# Patient Record
Sex: FEMALE | Race: WHITE | Employment: OTHER | URBAN - METROPOLITAN AREA
[De-identification: names, ages, dates, MRNs, and addresses within clinical notes are randomized per-mention and may not be internally consistent; named-entity substitution may affect disease eponyms.]

---

## 2017-01-03 ENCOUNTER — TELEPHONE (OUTPATIENT)
Dept: NEUROLOGY | Age: 82
End: 2017-01-03

## 2017-06-13 ENCOUNTER — TELEPHONE (OUTPATIENT)
Dept: NEUROLOGY | Age: 82
End: 2017-06-13

## 2017-07-20 ENCOUNTER — TELEPHONE (OUTPATIENT)
Dept: NEUROLOGY | Age: 82
End: 2017-07-20

## 2017-07-20 ENCOUNTER — OFFICE VISIT (OUTPATIENT)
Dept: NEUROLOGY | Age: 82
End: 2017-07-20

## 2017-07-20 VITALS
HEIGHT: 67 IN | WEIGHT: 151 LBS | SYSTOLIC BLOOD PRESSURE: 110 MMHG | HEART RATE: 80 BPM | OXYGEN SATURATION: 96 % | BODY MASS INDEX: 23.7 KG/M2 | DIASTOLIC BLOOD PRESSURE: 60 MMHG

## 2017-07-20 DIAGNOSIS — I63.232 ARTERIAL ISCHEMIC STROKE, ICA, LEFT, ACUTE (HCC): Primary | ICD-10-CM

## 2017-07-20 DIAGNOSIS — F34.1 DYSTHYMIA: ICD-10-CM

## 2017-07-20 DIAGNOSIS — I25.10 CAD IN NATIVE ARTERY: ICD-10-CM

## 2017-07-20 DIAGNOSIS — I10 HTN (HYPERTENSION), BENIGN: ICD-10-CM

## 2017-07-20 DIAGNOSIS — E78.2 MIXED HYPERLIPIDEMIA: ICD-10-CM

## 2017-07-20 PROCEDURE — G8400 PT W/DXA NO RESULTS DOC: HCPCS | Performed by: PSYCHIATRY & NEUROLOGY

## 2017-07-20 PROCEDURE — 1036F TOBACCO NON-USER: CPT | Performed by: PSYCHIATRY & NEUROLOGY

## 2017-07-20 PROCEDURE — 1123F ACP DISCUSS/DSCN MKR DOCD: CPT | Performed by: PSYCHIATRY & NEUROLOGY

## 2017-07-20 PROCEDURE — G8427 DOCREV CUR MEDS BY ELIG CLIN: HCPCS | Performed by: PSYCHIATRY & NEUROLOGY

## 2017-07-20 PROCEDURE — G8420 CALC BMI NORM PARAMETERS: HCPCS | Performed by: PSYCHIATRY & NEUROLOGY

## 2017-07-20 PROCEDURE — G8598 ASA/ANTIPLAT THER USED: HCPCS | Performed by: PSYCHIATRY & NEUROLOGY

## 2017-07-20 PROCEDURE — 1090F PRES/ABSN URINE INCON ASSESS: CPT | Performed by: PSYCHIATRY & NEUROLOGY

## 2017-07-20 PROCEDURE — 99214 OFFICE O/P EST MOD 30 MIN: CPT | Performed by: PSYCHIATRY & NEUROLOGY

## 2017-07-20 PROCEDURE — 4040F PNEUMOC VAC/ADMIN/RCVD: CPT | Performed by: PSYCHIATRY & NEUROLOGY

## 2018-01-29 ENCOUNTER — TELEPHONE (OUTPATIENT)
Dept: ORTHOPEDIC SURGERY | Age: 83
End: 2018-01-29

## 2018-01-29 PROBLEM — R53.1 WEAKNESS: Status: ACTIVE | Noted: 2018-01-29

## 2018-01-29 NOTE — TELEPHONE ENCOUNTER
There is a inpatient consult request at [place] [address]   Consult DX: left distal radius and ulna fx  RN is Alan Barton [phone]    Marilynn Alford consult info to [address].

## 2018-01-30 PROBLEM — S62.102S WRIST FRACTURE, CLOSED, LEFT, SEQUELA: Status: ACTIVE | Noted: 2018-01-30

## 2018-01-31 PROBLEM — R53.81 DEBILITY: Status: ACTIVE | Noted: 2018-01-31

## 2018-02-06 ENCOUNTER — TELEPHONE (OUTPATIENT)
Dept: ORTHOPEDIC SURGERY | Age: 83
End: 2018-02-06

## 2018-02-08 NOTE — TELEPHONE ENCOUNTER
Called and left voicemail stating to give our office a call back for Dr Pritesh Morrison was returning her call in regards to her mother

## 2018-02-13 ENCOUNTER — TELEPHONE (OUTPATIENT)
Dept: ORTHOPEDIC SURGERY | Age: 83
End: 2018-02-13

## 2018-02-22 ENCOUNTER — TELEPHONE (OUTPATIENT)
Dept: CARDIOLOGY CLINIC | Age: 83
End: 2018-02-22

## 2018-02-22 NOTE — TELEPHONE ENCOUNTER
Called left message on April --supervisor Sha 90 [phone]  I said we will put her on  schedule tomorrow and to call back if she cant make it. Please add onto  schedule Thanks

## 2018-02-23 ENCOUNTER — TELEPHONE (OUTPATIENT)
Dept: CARDIOLOGY CLINIC | Age: 83
End: 2018-02-23

## 2018-02-23 ENCOUNTER — TELEPHONE (OUTPATIENT)
Dept: CARDIOLOGY | Age: 83
End: 2018-02-23

## 2018-02-23 NOTE — TELEPHONE ENCOUNTER
Attempted to call,no answer.  Dr Lauren Alvarez stated she would call him after his wifes appointment Monday regarding abnormalities

## 2018-02-26 ENCOUNTER — OFFICE VISIT (OUTPATIENT)
Dept: CARDIOLOGY CLINIC | Age: 83
End: 2018-02-26

## 2018-02-26 VITALS
HEIGHT: 67 IN | WEIGHT: 127 LBS | BODY MASS INDEX: 19.93 KG/M2 | SYSTOLIC BLOOD PRESSURE: 160 MMHG | HEART RATE: 87 BPM | DIASTOLIC BLOOD PRESSURE: 90 MMHG

## 2018-02-26 DIAGNOSIS — I25.10 CAD IN NATIVE ARTERY: ICD-10-CM

## 2018-02-26 DIAGNOSIS — I50.9 CONGESTIVE HEART FAILURE, UNSPECIFIED CONGESTIVE HEART FAILURE CHRONICITY, UNSPECIFIED CONGESTIVE HEART FAILURE TYPE: ICD-10-CM

## 2018-02-26 DIAGNOSIS — I10 HTN (HYPERTENSION), BENIGN: Primary | ICD-10-CM

## 2018-02-26 DIAGNOSIS — I25.5 ISCHEMIC CARDIOMYOPATHY: ICD-10-CM

## 2018-02-26 DIAGNOSIS — I48.91 ATRIAL FIBRILLATION, UNSPECIFIED TYPE (HCC): ICD-10-CM

## 2018-02-26 PROCEDURE — 99204 OFFICE O/P NEW MOD 45 MIN: CPT | Performed by: INTERNAL MEDICINE

## 2018-02-26 PROCEDURE — 93000 ELECTROCARDIOGRAM COMPLETE: CPT | Performed by: INTERNAL MEDICINE

## 2018-02-26 RX ORDER — LISINOPRIL 5 MG/1
5 TABLET ORAL DAILY
Qty: 30 TABLET | Refills: 11 | Status: SHIPPED | OUTPATIENT
Start: 2018-02-26 | End: 2018-04-05 | Stop reason: DRUGHIGH

## 2018-02-26 NOTE — PROGRESS NOTES
History Narrative    No narrative on file        Family History:  No evidence for sudden cardiac death or premature CAD. Problem Relation Age of Onset    Cancer Father      unsure of type    Cancer Brother      lung cancer       Medications:  [x] Medications and dosages reviewed. Prior to Admission medications    Medication Sig Start Date End Date Taking? Authorizing Provider   lisinopril (PRINIVIL;ZESTRIL) 5 MG tablet Take 1 tablet by mouth daily 2/26/18  Yes Coyne Share, DO   acetaminophen (TYLENOL) 325 MG tablet Take 2 tablets by mouth 3 times daily 2/7/18  Yes Mega Fuentes MD   aspirin 81 MG EC tablet Take 1 tablet by mouth daily 11/18/16  Yes Mega Fuentes MD   atorvastatin (LIPITOR) 80 MG tablet Take 1 tablet by mouth nightly 11/18/16  Yes Mega Fuentes MD   carvedilol (COREG) 12.5 MG tablet Take 1 tablet by mouth 2 times daily (with meals) 11/18/16  Yes Mega Fuentes MD   clopidogrel (PLAVIX) 75 MG tablet Take 1 tablet by mouth daily 11/18/16  Yes Mega Fuentes MD   sertraline (ZOLOFT) 50 MG tablet Take 1 tablet by mouth daily 11/18/16  Yes Mega Fuentes MD   Omega-3 Fatty Acids (FISH OIL) 1200 MG CPDR Take by mouth   Yes Historical Provider, MD       Allergies:  Patient has no known allergies.      Review of Systems:    [x]Full ROS obtained and negative except as mentioned in HPI    Physical Examination:    BP (!) 160/90   Pulse 87   Ht 5' 7\" (1.702 m)   Wt 127 lb (57.6 kg)   BMI 19.89 kg/m²   Wt Readings from Last 3 Encounters:   02/26/18 127 lb (57.6 kg)   01/31/18 127 lb (57.6 kg)   01/29/18 127 lb (57.6 kg)       · GENERAL: Well developed,  no acute distress  · NEUROLOGICAL: Alert, flat affect, pleasant, quiet but answers appropriately     · SKIN: Warm and dry  · HEENT: Normocephalic, atraumatic, Sclera non-icteric, mucous membranes moist, facial droop  · NECK: supple, JVP normal  · CAROTID: Normal upstroke, no bruits  · CARDIAC: Normal PMI, regular rate and rhythm,

## 2018-02-28 ENCOUNTER — HOSPITAL ENCOUNTER (OUTPATIENT)
Dept: NON INVASIVE DIAGNOSTICS | Age: 83
Discharge: HOME | End: 2018-02-28
Attending: INTERNAL MEDICINE | Admitting: INTERNAL MEDICINE

## 2018-02-28 DIAGNOSIS — I25.10 ATHEROSCLEROTIC HEART DISEASE OF NATIVE CORONARY ARTERY WITHOUT ANGINA PECTORIS: ICD-10-CM

## 2018-02-28 LAB
LV EF: 60 %
LVEF MODALITY: NORMAL

## 2018-03-05 ENCOUNTER — TELEPHONE (OUTPATIENT)
Dept: CARDIOLOGY CLINIC | Age: 83
End: 2018-03-05

## 2018-03-05 NOTE — TELEPHONE ENCOUNTER
Pt  calling (He is presently in [facility] [address]) and has questions about his wife's health. She is 5'7\" and weighs 122 lbs he finds this alarming, her BP is not stable, he mentioned her being on Eliquis and questions concerning this as well.  Pls call to advise Thank you

## 2018-03-06 ENCOUNTER — TELEPHONE (OUTPATIENT)
Dept: CARDIOLOGY CLINIC | Age: 83
End: 2018-03-06

## 2018-03-06 NOTE — TELEPHONE ENCOUNTER
I spoke with patient's  and he is wanting to know if he can stop her Xarelto and Lasix. She has an appointment with RG NP on Thursday 3/8/18 and her  says she has been falling and wants to stop her Xarelto. He was told to have her keep her appointment on Thursday ad she could discuss this with RG. He was also concerned that her BP has been running too low 's-130's and he thinks that lasix is contributing. He was told that 120's-130's is an acceptable normal BP and that she could discuss this further with RG with her upcoming appointment as well. He verbalizes understanding and is agreeable to this plan.

## 2018-03-08 ENCOUNTER — OFFICE VISIT (OUTPATIENT)
Dept: CARDIOLOGY CLINIC | Age: 83
End: 2018-03-08

## 2018-03-08 ENCOUNTER — HOSPITAL ENCOUNTER (OUTPATIENT)
Dept: OTHER | Age: 83
Discharge: HOME | End: 2018-03-08
Attending: CLINICAL NURSE SPECIALIST | Admitting: CLINICAL NURSE SPECIALIST

## 2018-03-08 VITALS — SYSTOLIC BLOOD PRESSURE: 130 MMHG | HEART RATE: 72 BPM | DIASTOLIC BLOOD PRESSURE: 76 MMHG

## 2018-03-08 DIAGNOSIS — I50.32 CHRONIC DIASTOLIC HEART FAILURE (HCC): Primary | ICD-10-CM

## 2018-03-08 DIAGNOSIS — I25.83 CORONARY ARTERY DISEASE DUE TO LIPID RICH PLAQUE: ICD-10-CM

## 2018-03-08 DIAGNOSIS — I10 ESSENTIAL HYPERTENSION: ICD-10-CM

## 2018-03-08 DIAGNOSIS — D50.9 IRON DEFICIENCY ANEMIA, UNSPECIFIED IRON DEFICIENCY ANEMIA TYPE: ICD-10-CM

## 2018-03-08 DIAGNOSIS — I25.5 ISCHEMIC CARDIOMYOPATHY: ICD-10-CM

## 2018-03-08 DIAGNOSIS — I63.232 ARTERIAL ISCHEMIC STROKE, ICA, LEFT, ACUTE (HCC): ICD-10-CM

## 2018-03-08 DIAGNOSIS — I48.20 CHRONIC ATRIAL FIBRILLATION (HCC): ICD-10-CM

## 2018-03-08 DIAGNOSIS — I25.10 CORONARY ARTERY DISEASE DUE TO LIPID RICH PLAQUE: ICD-10-CM

## 2018-03-08 LAB
ANION GAP SERPL CALCULATED.3IONS-SCNC: 11 MMOL/L (ref 3–16)
BUN BLDV-MCNC: 32 MG/DL (ref 7–20)
CALCIUM SERPL-MCNC: 10 MG/DL (ref 8.3–10.6)
CHLORIDE BLD-SCNC: 104 MMOL/L (ref 99–110)
CO2: 31 MMOL/L (ref 21–32)
CREAT SERPL-MCNC: 1 MG/DL (ref 0.6–1.2)
FERRITIN: 62.1 NG/ML (ref 15–150)
FOLATE: 12.04 NG/ML (ref 4.78–24.2)
GFR AFRICAN AMERICAN: >60
GFR NON-AFRICAN AMERICAN: 53
GLUCOSE BLD-MCNC: 103 MG/DL (ref 70–99)
IRON SATURATION: 9 % (ref 15–50)
IRON: 33 UG/DL (ref 37–145)
POTASSIUM SERPL-SCNC: 4.2 MMOL/L (ref 3.5–5.1)
PRO-BNP: 2406 PG/ML (ref 0–449)
SODIUM BLD-SCNC: 146 MMOL/L (ref 136–145)
TOTAL IRON BINDING CAPACITY: 362 UG/DL (ref 260–445)
VITAMIN B-12: 481 PG/ML (ref 211–911)

## 2018-03-08 PROCEDURE — 1036F TOBACCO NON-USER: CPT | Performed by: CLINICAL NURSE SPECIALIST

## 2018-03-08 PROCEDURE — G8427 DOCREV CUR MEDS BY ELIG CLIN: HCPCS | Performed by: CLINICAL NURSE SPECIALIST

## 2018-03-08 PROCEDURE — 99215 OFFICE O/P EST HI 40 MIN: CPT | Performed by: CLINICAL NURSE SPECIALIST

## 2018-03-08 PROCEDURE — 4040F PNEUMOC VAC/ADMIN/RCVD: CPT | Performed by: CLINICAL NURSE SPECIALIST

## 2018-03-08 PROCEDURE — 1111F DSCHRG MED/CURRENT MED MERGE: CPT | Performed by: CLINICAL NURSE SPECIALIST

## 2018-03-08 PROCEDURE — G8420 CALC BMI NORM PARAMETERS: HCPCS | Performed by: CLINICAL NURSE SPECIALIST

## 2018-03-08 PROCEDURE — 1090F PRES/ABSN URINE INCON ASSESS: CPT | Performed by: CLINICAL NURSE SPECIALIST

## 2018-03-08 PROCEDURE — G8482 FLU IMMUNIZE ORDER/ADMIN: HCPCS | Performed by: CLINICAL NURSE SPECIALIST

## 2018-03-08 PROCEDURE — 1123F ACP DISCUSS/DSCN MKR DOCD: CPT | Performed by: CLINICAL NURSE SPECIALIST

## 2018-03-08 PROCEDURE — G8598 ASA/ANTIPLAT THER USED: HCPCS | Performed by: CLINICAL NURSE SPECIALIST

## 2018-03-08 PROCEDURE — G8400 PT W/DXA NO RESULTS DOC: HCPCS | Performed by: CLINICAL NURSE SPECIALIST

## 2018-03-08 RX ORDER — FUROSEMIDE 40 MG/1
40 TABLET ORAL DAILY
COMMUNITY
End: 2018-03-08 | Stop reason: DRUGHIGH

## 2018-03-08 RX ORDER — FUROSEMIDE 40 MG/1
20 TABLET ORAL SEE ADMIN INSTRUCTIONS
Qty: 60 TABLET | Status: ON HOLD | COMMUNITY
Start: 2018-03-08 | End: 2018-04-10

## 2018-03-08 RX ORDER — ALBUTEROL SULFATE 2.5 MG/3ML
2.5 SOLUTION RESPIRATORY (INHALATION) EVERY 6 HOURS PRN
COMMUNITY
End: 2019-03-29 | Stop reason: ALTCHOICE

## 2018-03-08 NOTE — PROGRESS NOTES
[address]  Progress Note    Primary Care Doctor:  Nora Spear MD    Chief Complaint   Patient presents with    Coronary Artery Disease        History of Present Illness:  80year old female with CAD with distant stent 2007, CVA in 10/16 in a wheelchair with right side residual, HTN, AF (on aspirin per family request), dCHF, HLD, anemia    I had the pleasure of seeing Dorothy Candelario in follow up for dCHF. Patient recently seen by Dr Terry Hunt per request of the . She is currently in a nursing in a wheelchair. She is here today with her daughter and , Reyes Pederson (recovering from amputation) on the phone. She was started on lasix 40 mg 2/8/18 and has lost 20 pounds. She had a cxr (report in records from nursing home) done 2/13 which showed mild CHF with slight improvement. The family is very concerned that she is becoming dehydrated. Labs done 3/5/18 show bnp of 374 (only one done) creat 1.0 potassium of 3.8 bun 33. The patient can not provide much information. BP has been fluctuation from -170  She is on a nectar thickened diet, did receive an extra dose of lasix last week. Her Hgb 8.6  Both  and daughter are unhappy with the nursing home ([facility])    Past Medical History:   has a past medical history of CAD (coronary artery disease); CVA (cerebral vascular accident) ([address] and Hypertension. Surgical History:   has a past surgical history that includes Coronary angioplasty with stent (2007) and Cosmetic surgery. Social History:   reports that she has never smoked. She has never used smokeless tobacco. She reports that she does not drink alcohol. Family History:   Family History   Problem Relation Age of Onset   [place] Cancer Father      unsure of type    Cancer Brother      lung cancer       Home Medications:  Prior to Admission medications    Medication Sig Start Date End Date Taking?  Authorizing Provider   enoxaparin (LOVENOX) 40 MG/0.4ML muscle strength, numbness or tingling. No change in gait, balance, coordination, mood, affect, memory, mentation, behavior. · Psychiatric: No anxiety, no depression. · Endocrine: No malaise, fatigue or temperature intolerance. No excessive thirst, fluid intake, or urination. No tremor. · Hematologic/Lymphatic: No abnormal bruising or bleeding, blood clots or swollen lymph nodes. · Allergic/Immunologic: No nasal congestion or hives. Physical Examination:    Vitals:    03/08/18 1416   BP: 130/76   Pulse: 72        Constitutional and General Appearance: Warm and dry, no apparent distress, normal coloration, facial droop (left)  HEENT:  Normocephalic, atraumatic  Respiratory:  · Normal excursion and expansion without use of accessory muscles  · Resp Auscultation: Normal breath sounds without dullness  Cardiovascular:  · The apical impulses not displaced  · Heart tones are crisp and normal  · JVP normal cm H2O  · irregular rate and rhythm  · Peripheral pulses are symmetrical and full  · There is no clubbing, cyanosis of the extremities.   · No edema  · Pedal Pulses: 2+ and equal   Abdomen:  · No masses or tenderness  · Liver/Spleen: No Abnormalities Noted  Neurological/Psychiatric:  · Alert and oriented in all spheres  · Left arm in split, right arm in brace and weak, right leg is able to move by patient lifting it  · Exhibits normal gait balance and coordination  · No abnormalities of mood, affect, memory, mentation, or behavior are noted    Lab Data:    CBC:   Lab Results   Component Value Date    WBC 8.3 02/08/2018    WBC 8.2 02/05/2018    WBC 8.5 02/01/2018    RBC 2.85 02/08/2018    RBC 2.96 02/05/2018    RBC 2.99 02/01/2018    HGB 8.6 02/08/2018    HGB 8.9 02/05/2018    HGB 9.0 02/01/2018    HCT 26.3 02/08/2018    HCT 26.7 02/05/2018    HCT 26.5 02/01/2018    MCV 92.2 02/08/2018    MCV 90.4 02/05/2018    MCV 88.4 02/01/2018    RDW 17.3 02/08/2018    RDW 16.2 02/05/2018    RDW 15.3 02/01/2018     2018     2018     2018     BMP:  Lab Results   Component Value Date     2018     2018     2018    K 4.3 2018    K 4.1 2018    K 3.9 2018     2018     2018     2018    CO2 26 2018    CO2 27 2018    CO2 29 2018    BUN 49 2018    BUN 35 2018    BUN 34 2018    CREATININE 0.9 2018    CREATININE 0.8 2018    CREATININE 1.0 2018     BNP: No results found for: PROBNP  Cardiac Imagin/28/18 echo   Normal left ventricle size and systolic function with an EF 60%. Moderate concentric left ventricular hypertrophy. Diastolic filling parameters suggests diastolic function. Moderate mitral regurgitation  The left atrium is dilated. Moderate tricuspid regurgitation. RVSP 44mmHg. The right atrium is mildly dilated. Mild pulmonic regurgitation    Echo 10/17/16  Left ventricle systolic function is mildly reduced with an estimated   ejection fraction of 45%. There is hypokinesis of the basal inferior and   inferior walls.   Normal left ventricle size.   Mild concentric left ventricular hypertrophy.   Diastolic filling parameters suggests grade I diastolic dysfunction .   Mild mitral regurgitation .   Trivial tricuspid regurgitation.   Systolic pulmonary artery pressure (SPAP) is normal and estimated at 18 mmHg   (RA pressure 3 mm Hg). Assessment:    1. Chronic diastolic heart failure (HCC) on ace and BB   2. Iron deficiency anemia, unspecified iron deficiency anemia type    3. Coronary artery disease due to lipid rich plaque on GDMT   4. Essential hypertension controlled in office visit today   5. Chronic atrial fibrillation (HCC) aspirin per family request   6. Ischemic cardiomyopathy    7. Arterial ischemic stroke, ICA, left, acute ([address]        Plan:   1. Please check iron studies along with bmp and bnp on Monday. Fax to [phone].   Daughter

## 2018-03-08 NOTE — PATIENT INSTRUCTIONS
1. Please check iron studies along with bmp and bnp on Monday. Fax to [phone]. Daughter decided to have the iron studies after her visit. She will need to still have bmp and bnp next week  2. Calorie count with nutrition to follow as patient may not be getting enough calories  3. Decrease lasix to 20 mg daily  4. RTO in 1 month  5. Please call us with any questions or issues [phone]  6.    Recommend follow up with neurologist regarding fluctuating BP

## 2018-03-12 ENCOUNTER — TELEPHONE (OUTPATIENT)
Dept: CARDIOLOGY CLINIC | Age: 83
End: 2018-03-12

## 2018-03-12 RX ORDER — FERROUS SULFATE 325(65) MG
325 TABLET ORAL 2 TIMES DAILY WITH MEALS
Qty: 30 TABLET | Refills: 0
Start: 2018-03-12 | End: 2019-03-29

## 2018-03-12 NOTE — TELEPHONE ENCOUNTER
No return call yet, so I called again. Spoke with Ab Mcdonnell on rehab unit and gave orders for ferrous sulfate 325 mg po bid. She confirmed that lasix has been decreased to 20 mg po daily.

## 2018-03-12 NOTE — TELEPHONE ENCOUNTER
----- Message from Xi Jauregui NP sent at 3/12/2018  8:45 AM EDT -----  Please call daughter and nursing home  Her iron is low and she should start iron 325 mg bid and make sure nursing home has decreased lasix 20 mg daily  Draw bnp and bmp in 2 weeks  thanks

## 2018-03-12 NOTE — TELEPHONE ENCOUNTER
Had to LVM at nursing unit for patient to call us for instructions from [facility].

## 2018-03-14 ENCOUNTER — TELEPHONE (OUTPATIENT)
Dept: CARDIOLOGY CLINIC | Age: 83
End: 2018-03-14

## 2018-03-14 NOTE — TELEPHONE ENCOUNTER
Per nurse on rehab unit at [facility],  BP has been:    3/14 am:  151/74    3/13: am 178/87,  Pm 155/90     3/12 pm 118/56      3/11 155/85 previous BPs: 139/72, 138/87   There are parameters to hold lisinopril 5mg unless BP is over 135/85. So there has been some ups and downs due to her getting the lisinopril, then not needing it. Dr. Shayy Lopez saw patient yesterday and changed lisinopril dosing time to PM to see if this helps.   In past four days there was only one BP where systolic was 040

## 2018-03-15 ENCOUNTER — OFFICE VISIT (OUTPATIENT)
Dept: ORTHOPEDIC SURGERY | Age: 83
End: 2018-03-15

## 2018-03-15 VITALS — WEIGHT: 127 LBS | HEIGHT: 67 IN | RESPIRATION RATE: 16 BRPM | BODY MASS INDEX: 19.93 KG/M2

## 2018-03-15 DIAGNOSIS — S52.572A OTHER CLOSED INTRA-ARTICULAR FRACTURE OF DISTAL END OF LEFT RADIUS, INITIAL ENCOUNTER: Primary | ICD-10-CM

## 2018-03-15 PROCEDURE — 99024 POSTOP FOLLOW-UP VISIT: CPT | Performed by: NURSE PRACTITIONER

## 2018-03-15 NOTE — PROGRESS NOTES
Medication Sig Dispense Refill    ferrous sulfate (DALJIT-HARRIS) 325 (65 Fe) MG tablet Take 1 tablet by mouth 2 times daily (with meals) 30 tablet 0    enoxaparin (LOVENOX) 40 MG/0.4ML injection Inject 40 mg into the skin daily      albuterol (PROVENTIL) (2.5 MG/3ML) 0.083% nebulizer solution Take 2.5 mg by nebulization every 6 hours as needed for Wheezing      furosemide (LASIX) 40 MG tablet Take 0.5 tablets by mouth daily 60 tablet     lisinopril (PRINIVIL;ZESTRIL) 5 MG tablet Take 1 tablet by mouth daily 30 tablet 11    acetaminophen (TYLENOL) 325 MG tablet Take 2 tablets by mouth 3 times daily 120 tablet 0    aspirin 81 MG EC tablet Take 1 tablet by mouth daily 30 tablet 3    atorvastatin (LIPITOR) 80 MG tablet Take 1 tablet by mouth nightly 30 tablet 3    carvedilol (COREG) 12.5 MG tablet Take 1 tablet by mouth 2 times daily (with meals) 60 tablet 3    clopidogrel (PLAVIX) 75 MG tablet Take 1 tablet by mouth daily 30 tablet 3    sertraline (ZOLOFT) 50 MG tablet Take 1 tablet by mouth daily 30 tablet 3    Omega-3 Fatty Acids (FISH OIL) 1200 MG CPDR Take by mouth       No current facility-administered medications on file prior to visit. Pertinent items are noted in HPI  Review of systems reviewed from Patient History Form dated on 3/15/2018 and available in the patient's chart under the Media tab. PHYSICAL EXAMINATION:  Ms. Rebecca Banda is a very pleasant 80 y.o.  female who presents today in no acute distress, awake, alert. She is well dressed, nourished and  groomed. Patient with normal affect. Height is  5' 7\" (1.702 m), weight is 127 lb (57.6 kg), Body mass index is 19.89 kg/m². Resting respiratory rate is 16. On evaluation of her left upper extremity, there is no deformity. There is minimal swelling and no ecchymosis. She is nontender to palpation over the distal radius, and otherwise nontender over the remainder of the extremity.   Range of motion is decreased left wrist, but no mechanical block. The skin overlying the left wrist has skin excoriation with scabbed abrasions on the akins surface. No drainage. Distal pulses are 2+ and symmetric bilaterally. Sensation is grossly intact to light touch and symmetric bilaterally. IMAGING:  Xrays dated today in office, 3 views of left wrist were reviewed, and showed minimally displaced distal radius fracture. IMPRESSION:  Left minimally displaced distal radius fracture. PLAN: I discussed that the overall alignment of this fracture. She can discontinue the brace left wrist, WBAT. No heavy lifting. I discussed with the patient that I think that she would really benefit from a course of physical therapy for further strengthening and stretching. We will see her  back in 6 weeks at which time we will get a new xray of the left wrist.      As this patient has demonstrated risk factors for osteoporosis, such as age greater than [de-identified] years and evidence of a fracture, I have referred the patient back to the primary care physician for evaluation for osteoporosis, including consideration for DEXA scanning, if this is felt to be clinically indicated. The patient is advised to contact the primary care physician to follow-up for further evaluation. Dictated by Denece Dancer, CNP and seen and evaluated by Dr Justino Gaffney.

## 2018-03-16 ENCOUNTER — TELEPHONE (OUTPATIENT)
Dept: CARDIOLOGY CLINIC | Age: 83
End: 2018-03-16

## 2018-03-16 NOTE — TELEPHONE ENCOUNTER
Spoke w/ pt  and gave recs per Edilma Wick NP. He is going to have his daughter call for med rec, as well.

## 2018-03-16 NOTE — TELEPHONE ENCOUNTER
Spoke with patients  daughter/: BP's (some readings)   3/11/18- 110/55  3/13/18- 178/87  3/14/18- 118/52  3/15/18- 160/96  Patients  seems very concerned about patients condition, does not think nursing home is really taking great care of patient. . Patient gets dizzy spells and thinks patients has been acting different. Per [facility] MD should be in control of patients care. Patients  requesting MD tavares call them to talk; states it is best to talk with daughter.

## 2018-03-19 ENCOUNTER — TELEPHONE (OUTPATIENT)
Dept: CARDIOLOGY CLINIC | Age: 83
End: 2018-03-19

## 2018-03-19 NOTE — TELEPHONE ENCOUNTER
Returning a call to the daughter Teresita Hodgkin and she states that with the medication changes made over the weekend  her mother's blood pressure remains high. Medication changes as instructed in previous phone call. She wants her mother to be seen this week in office for BP medication management with Dr. Sheyla Bhagat or RG NP. She is concerned that her mother is at risks for another stroke if her BP remains too elevated. Please advise if we may add her to your schedule. Thanks.

## 2018-03-20 ENCOUNTER — TELEPHONE (OUTPATIENT)
Dept: CARDIOLOGY CLINIC | Age: 83
End: 2018-03-20

## 2018-03-20 DIAGNOSIS — E78.2 MIXED HYPERLIPIDEMIA: Primary | ICD-10-CM

## 2018-03-20 DIAGNOSIS — I25.10 CAD IN NATIVE ARTERY: ICD-10-CM

## 2018-03-20 DIAGNOSIS — I63.9 CEREBROVASCULAR ACCIDENT (CVA), UNSPECIFIED MECHANISM (HCC): ICD-10-CM

## 2018-03-20 DIAGNOSIS — I10 HTN (HYPERTENSION), BENIGN: ICD-10-CM

## 2018-03-20 DIAGNOSIS — I50.32 CHRONIC DIASTOLIC HEART FAILURE (HCC): ICD-10-CM

## 2018-03-20 NOTE — TELEPHONE ENCOUNTER
Patient daughter called [facility] and spoke to Dr Jovany Osullivan who is taking care of this patient . The daughter wants the patients lisinopril increased to 10mg QD at 8pm. Dr Jovany Osullivan says he doesn't have a problem with the change in lisinopril dose as long as it is ok with her cardiologist. Please call [facility] to discuss order .  Patient has seen [facility] and [address] [place] [address]

## 2018-03-21 ENCOUNTER — TELEPHONE (OUTPATIENT)
Dept: CARDIOLOGY CLINIC | Age: 83
End: 2018-03-21

## 2018-03-22 ENCOUNTER — TELEPHONE (OUTPATIENT)
Dept: CARDIOLOGY CLINIC | Age: 83
End: 2018-03-22

## 2018-03-22 NOTE — TELEPHONE ENCOUNTER
Labs received from [facility]  Creat 1.1 potassium 4.0 bnp up from 374 -->549  If blood pressure is still elevated and any sob, then she may need a little more lasix.   Maybe increase lasix to 40 mg 3 days of the week and the rest 20 mg  Please call daughter, Deepti with suggestions and nursing home  thanks

## 2018-03-23 ENCOUNTER — TELEPHONE (OUTPATIENT)
Dept: CARDIOLOGY CLINIC | Age: 83
End: 2018-03-23

## 2018-03-23 NOTE — TELEPHONE ENCOUNTER
I spoke with Anuradha at [facility]. She states the daughter, Deepti, came to her yesterday afternoon around 4 pm telling her she needed to change the orders. Was questioning if the lisinopril hold BP parameters should be changed to 100/60 or maybe change the dose to 10 mg bid or 5 mg in am or 10 mg in pm.  Anuradha told the daughter she would call to check on the orders this morning with us. I went over with her the orders given yesterday afternoon from [facility] [place] after she spoke with patient's  and nurse there [facility]) they have orders for lisinopril 10 mg to be given at 8pm daily and the lasix 40 mg on M-W-F and 20 mg other days of the week per yesterday's phone conversation. BP last PM was 135/66 and this am was 148/74. She will be rechecking BP after this phone conversation as it is 2 hrs after meds this am/lasix given. Would like for [facility] to call or receive call back if there are going to be any changes.  [phone]

## 2018-03-23 NOTE — TELEPHONE ENCOUNTER
I spoke to , Lisandra Gaitan yesterday and hold lisinopril sbp<100 and lisinopril is 10 mg at 8 pm  Jason Samuels can call us anytime if BP continues to be elevated sbp>140  I had LM on there \"grey\" line yesterday and  was to talk with his daughter, Deepti to make sure she knew what we (Lisandra Gaitan and I) discussed  thanks

## 2018-03-28 ENCOUNTER — TELEPHONE (OUTPATIENT)
Dept: CARDIOLOGY CLINIC | Age: 83
End: 2018-03-28

## 2018-03-28 NOTE — TELEPHONE ENCOUNTER
I spoke with , Delmar Sears and Taz Moreira from the nursing home. The vitals are being faxed from the nursing home for me to review.   She is receiving her lisinopril 10 mg in the evening  Will review vitals once received before making any changes in medications  Patient has an appt with me next week

## 2018-03-29 ENCOUNTER — TELEPHONE (OUTPATIENT)
Dept: CARDIOLOGY CLINIC | Age: 83
End: 2018-03-29

## 2018-03-29 NOTE — TELEPHONE ENCOUNTER
I spoke to  again today. I explained to him again that I did not increase lisinopril as we had discussed yesterday as I received a summary of her BP from the nursing home and only 1 was elevated. He is concerned that she may have the flu. I explained that Dr Mika Burt at the nursing home should be able to assess this/treat if needed. He himself is in a nursing home.
follow as patient may not be getting enough calories  3. Decrease lasix to 20 mg daily  4. RTO in 1 month  5. Please call us with any questions or issues [phone]  6.    Recommend follow up with neurologist regarding fluctuating BP

## 2018-04-02 ENCOUNTER — TELEPHONE (OUTPATIENT)
Dept: CARDIOLOGY CLINIC | Age: 83
End: 2018-04-02

## 2018-04-05 ENCOUNTER — TELEPHONE (OUTPATIENT)
Dept: CARDIOLOGY CLINIC | Age: 83
End: 2018-04-05

## 2018-04-05 ENCOUNTER — OFFICE VISIT (OUTPATIENT)
Dept: CARDIOLOGY CLINIC | Age: 83
End: 2018-04-05

## 2018-04-05 VITALS
SYSTOLIC BLOOD PRESSURE: 150 MMHG | HEIGHT: 67 IN | HEART RATE: 84 BPM | DIASTOLIC BLOOD PRESSURE: 70 MMHG | TEMPERATURE: 98 F | OXYGEN SATURATION: 98 % | WEIGHT: 126 LBS | BODY MASS INDEX: 19.78 KG/M2

## 2018-04-05 DIAGNOSIS — R05.9 COUGH: ICD-10-CM

## 2018-04-05 DIAGNOSIS — I25.5 ISCHEMIC CARDIOMYOPATHY: ICD-10-CM

## 2018-04-05 DIAGNOSIS — I25.10 CORONARY ARTERY DISEASE DUE TO LIPID RICH PLAQUE: ICD-10-CM

## 2018-04-05 DIAGNOSIS — I50.32 CHRONIC DIASTOLIC HEART FAILURE (HCC): Primary | ICD-10-CM

## 2018-04-05 DIAGNOSIS — I63.9 CEREBROVASCULAR ACCIDENT (CVA), UNSPECIFIED MECHANISM (HCC): ICD-10-CM

## 2018-04-05 DIAGNOSIS — I48.20 CHRONIC ATRIAL FIBRILLATION (HCC): ICD-10-CM

## 2018-04-05 DIAGNOSIS — I10 ESSENTIAL HYPERTENSION: ICD-10-CM

## 2018-04-05 DIAGNOSIS — I25.83 CORONARY ARTERY DISEASE DUE TO LIPID RICH PLAQUE: ICD-10-CM

## 2018-04-05 PROBLEM — J69.0 ASPIRATION PNEUMONIA (HCC): Status: ACTIVE | Noted: 2018-04-05

## 2018-04-05 PROCEDURE — 1036F TOBACCO NON-USER: CPT | Performed by: CLINICAL NURSE SPECIALIST

## 2018-04-05 PROCEDURE — G8420 CALC BMI NORM PARAMETERS: HCPCS | Performed by: CLINICAL NURSE SPECIALIST

## 2018-04-05 PROCEDURE — 99214 OFFICE O/P EST MOD 30 MIN: CPT | Performed by: CLINICAL NURSE SPECIALIST

## 2018-04-05 PROCEDURE — G8427 DOCREV CUR MEDS BY ELIG CLIN: HCPCS | Performed by: CLINICAL NURSE SPECIALIST

## 2018-04-05 PROCEDURE — G8400 PT W/DXA NO RESULTS DOC: HCPCS | Performed by: CLINICAL NURSE SPECIALIST

## 2018-04-05 PROCEDURE — G8598 ASA/ANTIPLAT THER USED: HCPCS | Performed by: CLINICAL NURSE SPECIALIST

## 2018-04-05 PROCEDURE — 1123F ACP DISCUSS/DSCN MKR DOCD: CPT | Performed by: CLINICAL NURSE SPECIALIST

## 2018-04-05 PROCEDURE — 1090F PRES/ABSN URINE INCON ASSESS: CPT | Performed by: CLINICAL NURSE SPECIALIST

## 2018-04-05 PROCEDURE — 4040F PNEUMOC VAC/ADMIN/RCVD: CPT | Performed by: CLINICAL NURSE SPECIALIST

## 2018-04-10 PROBLEM — J69.0 ASPIRATION PNEUMONIA (HCC): Status: RESOLVED | Noted: 2018-04-05 | Resolved: 2018-04-10

## 2018-04-11 ENCOUNTER — TELEPHONE (OUTPATIENT)
Dept: PULMONOLOGY | Age: 83
End: 2018-04-11

## 2018-04-12 ENCOUNTER — TELEPHONE (OUTPATIENT)
Dept: PULMONOLOGY | Age: 83
End: 2018-04-12

## 2018-04-26 ENCOUNTER — OFFICE VISIT (OUTPATIENT)
Dept: ORTHOPEDIC SURGERY | Age: 83
End: 2018-04-26

## 2018-04-26 ENCOUNTER — TELEPHONE (OUTPATIENT)
Dept: ORTHOPEDIC SURGERY | Age: 83
End: 2018-04-26

## 2018-04-26 VITALS
DIASTOLIC BLOOD PRESSURE: 104 MMHG | HEIGHT: 67 IN | SYSTOLIC BLOOD PRESSURE: 180 MMHG | BODY MASS INDEX: 19.78 KG/M2 | WEIGHT: 126 LBS | HEART RATE: 71 BPM

## 2018-04-26 DIAGNOSIS — S52.572A OTHER CLOSED INTRA-ARTICULAR FRACTURE OF DISTAL END OF LEFT RADIUS, INITIAL ENCOUNTER: Primary | ICD-10-CM

## 2018-04-26 PROBLEM — I50.33 ACUTE ON CHRONIC DIASTOLIC HEART FAILURE (HCC): Status: ACTIVE | Noted: 2018-04-26

## 2018-04-26 PROCEDURE — 99024 POSTOP FOLLOW-UP VISIT: CPT | Performed by: ORTHOPAEDIC SURGERY

## 2018-04-27 PROBLEM — I50.32 CHRONIC DIASTOLIC HEART FAILURE (HCC): Status: ACTIVE | Noted: 2018-04-26

## 2018-05-09 ENCOUNTER — OFFICE VISIT (OUTPATIENT)
Dept: CARDIOLOGY CLINIC | Age: 83
End: 2018-05-09

## 2018-05-09 VITALS — WEIGHT: 131.7 LBS | HEIGHT: 67 IN | BODY MASS INDEX: 20.67 KG/M2

## 2018-05-09 DIAGNOSIS — I50.32 CHRONIC DIASTOLIC HEART FAILURE (HCC): Primary | ICD-10-CM

## 2018-05-09 PROCEDURE — 99999 PR OFFICE/OUTPT VISIT,PROCEDURE ONLY: CPT | Performed by: NURSE PRACTITIONER

## 2018-05-23 ENCOUNTER — TELEPHONE (OUTPATIENT)
Dept: CARDIOLOGY CLINIC | Age: 83
End: 2018-05-23

## 2018-05-30 ENCOUNTER — HOSPITAL ENCOUNTER (OUTPATIENT)
Dept: OTHER | Age: 83
Discharge: HOME | End: 2018-05-30
Attending: INTERNAL MEDICINE | Admitting: INTERNAL MEDICINE

## 2018-05-30 ENCOUNTER — OFFICE VISIT (OUTPATIENT)
Dept: CARDIOLOGY CLINIC | Age: 83
End: 2018-05-30

## 2018-05-30 ENCOUNTER — TELEPHONE (OUTPATIENT)
Dept: CARDIOLOGY CLINIC | Age: 83
End: 2018-05-30

## 2018-05-30 VITALS
DIASTOLIC BLOOD PRESSURE: 74 MMHG | SYSTOLIC BLOOD PRESSURE: 128 MMHG | BODY MASS INDEX: 20.72 KG/M2 | HEART RATE: 92 BPM | WEIGHT: 132 LBS | HEIGHT: 67 IN

## 2018-05-30 DIAGNOSIS — I50.32 CHRONIC DIASTOLIC CONGESTIVE HEART FAILURE (HCC): ICD-10-CM

## 2018-05-30 DIAGNOSIS — I50.32 CHRONIC DIASTOLIC HEART FAILURE (HCC): ICD-10-CM

## 2018-05-30 DIAGNOSIS — I50.32 CHRONIC DIASTOLIC CONGESTIVE HEART FAILURE (HCC): Primary | ICD-10-CM

## 2018-05-30 LAB
ANION GAP SERPL CALCULATED.3IONS-SCNC: 12 MMOL/L (ref 3–16)
BUN BLDV-MCNC: 53 MG/DL (ref 7–20)
CALCIUM SERPL-MCNC: 11.2 MG/DL (ref 8.3–10.6)
CHLORIDE BLD-SCNC: 103 MMOL/L (ref 99–110)
CO2: 25 MMOL/L (ref 21–32)
CREAT SERPL-MCNC: 1.1 MG/DL (ref 0.6–1.2)
GFR AFRICAN AMERICAN: 57
GFR NON-AFRICAN AMERICAN: 47
GLUCOSE BLD-MCNC: 101 MG/DL (ref 70–99)
POTASSIUM SERPL-SCNC: 4.7 MMOL/L (ref 3.5–5.1)
PRO-BNP: 1085 PG/ML (ref 0–449)
SODIUM BLD-SCNC: 140 MMOL/L (ref 136–145)

## 2018-05-30 PROCEDURE — 99214 OFFICE O/P EST MOD 30 MIN: CPT | Performed by: INTERNAL MEDICINE

## 2018-05-31 ENCOUNTER — TELEPHONE (OUTPATIENT)
Dept: CARDIOLOGY CLINIC | Age: 83
End: 2018-05-31

## 2018-07-06 ENCOUNTER — TELEPHONE (OUTPATIENT)
Dept: CARDIOLOGY CLINIC | Age: 83
End: 2018-07-06

## 2018-07-06 RX ORDER — ASPIRIN 81 MG/1
81 TABLET ORAL DAILY
Qty: 90 TABLET | Refills: 3 | Status: SHIPPED | OUTPATIENT
Start: 2018-07-06 | End: 2018-07-06 | Stop reason: SDUPTHER

## 2018-07-06 RX ORDER — CARVEDILOL 12.5 MG/1
12.5 TABLET ORAL 2 TIMES DAILY WITH MEALS
Qty: 180 TABLET | Refills: 3 | Status: SHIPPED | OUTPATIENT
Start: 2018-07-06 | End: 2018-07-06 | Stop reason: SDUPTHER

## 2018-07-06 RX ORDER — ASPIRIN 81 MG/1
81 TABLET ORAL DAILY
Qty: 90 TABLET | Refills: 3 | Status: SHIPPED | OUTPATIENT
Start: 2018-07-06 | End: 2020-01-30 | Stop reason: SDUPTHER

## 2018-07-06 RX ORDER — LISINOPRIL 10 MG/1
10 TABLET ORAL DAILY
Qty: 90 TABLET | Refills: 3 | Status: ON HOLD | OUTPATIENT
Start: 2018-07-06 | End: 2018-11-15 | Stop reason: HOSPADM

## 2018-07-06 RX ORDER — ATORVASTATIN CALCIUM 80 MG/1
80 TABLET, FILM COATED ORAL NIGHTLY
Qty: 90 TABLET | Refills: 3 | Status: SHIPPED | OUTPATIENT
Start: 2018-07-06 | End: 2019-03-29

## 2018-07-06 RX ORDER — LISINOPRIL 10 MG/1
10 TABLET ORAL DAILY
Qty: 90 TABLET | Refills: 1 | Status: SHIPPED | OUTPATIENT
Start: 2018-07-06 | End: 2018-07-06 | Stop reason: SDUPTHER

## 2018-07-06 RX ORDER — CLOPIDOGREL BISULFATE 75 MG/1
75 TABLET ORAL DAILY
Qty: 90 TABLET | Refills: 3 | Status: SHIPPED | OUTPATIENT
Start: 2018-07-06 | End: 2019-01-14 | Stop reason: SDUPTHER

## 2018-07-06 RX ORDER — ATORVASTATIN CALCIUM 80 MG/1
80 TABLET, FILM COATED ORAL NIGHTLY
Qty: 90 TABLET | Refills: 3 | Status: SHIPPED | OUTPATIENT
Start: 2018-07-06 | End: 2018-07-06 | Stop reason: SDUPTHER

## 2018-07-06 RX ORDER — CARVEDILOL 12.5 MG/1
12.5 TABLET ORAL 2 TIMES DAILY WITH MEALS
Qty: 180 TABLET | Refills: 3 | Status: ON HOLD | OUTPATIENT
Start: 2018-07-06 | End: 2018-11-15 | Stop reason: HOSPADM

## 2018-07-06 RX ORDER — CLOPIDOGREL BISULFATE 75 MG/1
75 TABLET ORAL DAILY
Qty: 90 TABLET | Refills: 3 | Status: SHIPPED | OUTPATIENT
Start: 2018-07-06 | End: 2018-07-06 | Stop reason: SDUPTHER

## 2018-07-06 NOTE — TELEPHONE ENCOUNTER
Medication Refill    When was your last appointment with cardiology?  (if 1year or longer, please schedule an appointment)    Medication needing refilled:carvedilol 12.5mg , clopidogrel 75mg , atorvastatin ,  ASA     Doseage of the medication:    How are you taking this medication (QD, BID, TID, QID, PRN):    Patient want a 30 or 90 day supply called in:    Which Pharmacy are we sending the medication to:[facility] in [place] [address] Phone number:    Pharmacy Fax number:      Call  to let him know this was done

## 2018-07-06 NOTE — TELEPHONE ENCOUNTER
Pt  calling back needs Rx lisinopril (PRINIVIL;ZESTRIL) 20 MG tablet added too.  Pls call to advise Thank you

## 2018-11-04 ENCOUNTER — APPOINTMENT (OUTPATIENT)
Dept: CT IMAGING | Age: 83
DRG: 563 | End: 2018-11-04
Payer: MEDICARE

## 2018-11-04 ENCOUNTER — APPOINTMENT (OUTPATIENT)
Dept: GENERAL RADIOLOGY | Age: 83
DRG: 563 | End: 2018-11-04
Payer: MEDICARE

## 2018-11-04 ENCOUNTER — HOSPITAL ENCOUNTER (INPATIENT)
Age: 83
LOS: 11 days | Discharge: NURSING, REHAB OR LONG-TERM CARE | DRG: 563 | End: 2018-11-15
Attending: EMERGENCY MEDICINE | Admitting: EMERGENCY MEDICINE
Payer: MEDICARE

## 2018-11-04 DIAGNOSIS — S82.401A TIBIA/FIBULA FRACTURE, RIGHT, CLOSED, INITIAL ENCOUNTER: ICD-10-CM

## 2018-11-04 DIAGNOSIS — R82.90 FOUL SMELLING URINE: ICD-10-CM

## 2018-11-04 DIAGNOSIS — S82.201A TIBIA/FIBULA FRACTURE, RIGHT, CLOSED, INITIAL ENCOUNTER: ICD-10-CM

## 2018-11-04 DIAGNOSIS — R41.0 ACUTE CONFUSION: Primary | ICD-10-CM

## 2018-11-04 PROBLEM — S82.131A RIGHT MEDIAL TIBIAL PLATEAU FRACTURE, CLOSED, INITIAL ENCOUNTER: Status: ACTIVE | Noted: 2018-11-04

## 2018-11-04 LAB
A/G RATIO: 1.2 (ref 1.1–2.2)
ALBUMIN SERPL-MCNC: 3.6 G/DL (ref 3.4–5)
ALP BLD-CCNC: 176 U/L (ref 40–129)
ALT SERPL-CCNC: 12 U/L (ref 10–40)
ANION GAP SERPL CALCULATED.3IONS-SCNC: 10 MMOL/L (ref 3–16)
AST SERPL-CCNC: 22 U/L (ref 15–37)
BACTERIA: ABNORMAL /HPF
BASOPHILS ABSOLUTE: 0 K/UL (ref 0–0.2)
BASOPHILS RELATIVE PERCENT: 0.6 %
BILIRUB SERPL-MCNC: 2.3 MG/DL (ref 0–1)
BILIRUBIN URINE: NEGATIVE
BLOOD, URINE: ABNORMAL
BUN BLDV-MCNC: 20 MG/DL (ref 7–20)
CALCIUM SERPL-MCNC: 9.9 MG/DL (ref 8.3–10.6)
CHLORIDE BLD-SCNC: 104 MMOL/L (ref 99–110)
CLARITY: CLEAR
CO2: 26 MMOL/L (ref 21–32)
COLOR: YELLOW
CREAT SERPL-MCNC: 1 MG/DL (ref 0.6–1.2)
EOSINOPHILS ABSOLUTE: 0.2 K/UL (ref 0–0.6)
EOSINOPHILS RELATIVE PERCENT: 2.5 %
EPITHELIAL CELLS, UA: 2 /HPF (ref 0–5)
GFR AFRICAN AMERICAN: >60
GFR NON-AFRICAN AMERICAN: 53
GLOBULIN: 2.9 G/DL
GLUCOSE BLD-MCNC: 104 MG/DL (ref 70–99)
GLUCOSE URINE: NEGATIVE MG/DL
HCT VFR BLD CALC: 34.8 % (ref 36–48)
HEMOGLOBIN: 11.2 G/DL (ref 12–16)
HYALINE CASTS: 1 /LPF (ref 0–8)
KETONES, URINE: NEGATIVE MG/DL
LACTIC ACID: 1.2 MMOL/L (ref 0.4–2)
LEUKOCYTE ESTERASE, URINE: ABNORMAL
LYMPHOCYTES ABSOLUTE: 2.6 K/UL (ref 1–5.1)
LYMPHOCYTES RELATIVE PERCENT: 33 %
MCH RBC QN AUTO: 29.2 PG (ref 26–34)
MCHC RBC AUTO-ENTMCNC: 32.2 G/DL (ref 31–36)
MCV RBC AUTO: 90.8 FL (ref 80–100)
MICROSCOPIC EXAMINATION: YES
MONOCYTES ABSOLUTE: 0.5 K/UL (ref 0–1.3)
MONOCYTES RELATIVE PERCENT: 6.4 %
NEUTROPHILS ABSOLUTE: 4.5 K/UL (ref 1.7–7.7)
NEUTROPHILS RELATIVE PERCENT: 57.5 %
NITRITE, URINE: NEGATIVE
PDW BLD-RTO: 16.2 % (ref 12.4–15.4)
PH UA: 6.5
PLATELET # BLD: 238 K/UL (ref 135–450)
PMV BLD AUTO: 8.5 FL (ref 5–10.5)
POTASSIUM SERPL-SCNC: 3.8 MMOL/L (ref 3.5–5.1)
PROTEIN UA: ABNORMAL MG/DL
RBC # BLD: 3.84 M/UL (ref 4–5.2)
RBC UA: 9 /HPF (ref 0–4)
SODIUM BLD-SCNC: 140 MMOL/L (ref 136–145)
SPECIFIC GRAVITY UA: 1.02
TOTAL PROTEIN: 6.5 G/DL (ref 6.4–8.2)
URINE REFLEX TO CULTURE: YES
URINE TYPE: ABNORMAL
UROBILINOGEN, URINE: 2 E.U./DL
WBC # BLD: 7.8 K/UL (ref 4–11)
WBC UA: 11 /HPF (ref 0–5)

## 2018-11-04 PROCEDURE — 87086 URINE CULTURE/COLONY COUNT: CPT

## 2018-11-04 PROCEDURE — 83605 ASSAY OF LACTIC ACID: CPT

## 2018-11-04 PROCEDURE — 73560 X-RAY EXAM OF KNEE 1 OR 2: CPT

## 2018-11-04 PROCEDURE — 80053 COMPREHEN METABOLIC PANEL: CPT

## 2018-11-04 PROCEDURE — 2580000003 HC RX 258: Performed by: FAMILY MEDICINE

## 2018-11-04 PROCEDURE — 85025 COMPLETE CBC W/AUTO DIFF WBC: CPT

## 2018-11-04 PROCEDURE — 87040 BLOOD CULTURE FOR BACTERIA: CPT

## 2018-11-04 PROCEDURE — 96374 THER/PROPH/DIAG INJ IV PUSH: CPT

## 2018-11-04 PROCEDURE — 71045 X-RAY EXAM CHEST 1 VIEW: CPT

## 2018-11-04 PROCEDURE — 6370000000 HC RX 637 (ALT 250 FOR IP): Performed by: FAMILY MEDICINE

## 2018-11-04 PROCEDURE — 2500000003 HC RX 250 WO HCPCS: Performed by: NURSE PRACTITIONER

## 2018-11-04 PROCEDURE — 81001 URINALYSIS AUTO W/SCOPE: CPT

## 2018-11-04 PROCEDURE — 99285 EMERGENCY DEPT VISIT HI MDM: CPT

## 2018-11-04 PROCEDURE — 73590 X-RAY EXAM OF LOWER LEG: CPT

## 2018-11-04 PROCEDURE — 70450 CT HEAD/BRAIN W/O DYE: CPT

## 2018-11-04 PROCEDURE — 6370000000 HC RX 637 (ALT 250 FOR IP): Performed by: NURSE PRACTITIONER

## 2018-11-04 PROCEDURE — 6360000002 HC RX W HCPCS: Performed by: NURSE PRACTITIONER

## 2018-11-04 PROCEDURE — 1200000000 HC SEMI PRIVATE

## 2018-11-04 PROCEDURE — 93005 ELECTROCARDIOGRAM TRACING: CPT | Performed by: EMERGENCY MEDICINE

## 2018-11-04 RX ORDER — TRAMADOL HYDROCHLORIDE 50 MG/1
50 TABLET ORAL EVERY 4 HOURS PRN
Status: DISCONTINUED | OUTPATIENT
Start: 2018-11-04 | End: 2018-11-12

## 2018-11-04 RX ORDER — CARVEDILOL 3.12 MG/1
6.25 TABLET ORAL ONCE
Status: COMPLETED | OUTPATIENT
Start: 2018-11-04 | End: 2018-11-04

## 2018-11-04 RX ORDER — ATORVASTATIN CALCIUM 40 MG/1
80 TABLET, FILM COATED ORAL NIGHTLY
Status: DISCONTINUED | OUTPATIENT
Start: 2018-11-04 | End: 2018-11-13

## 2018-11-04 RX ORDER — FUROSEMIDE 20 MG/1
20 TABLET ORAL DAILY PRN
Status: DISCONTINUED | OUTPATIENT
Start: 2018-11-04 | End: 2018-11-15 | Stop reason: HOSPADM

## 2018-11-04 RX ORDER — MORPHINE SULFATE 4 MG/ML
4 INJECTION, SOLUTION INTRAMUSCULAR; INTRAVENOUS EVERY 4 HOURS PRN
Status: DISCONTINUED | OUTPATIENT
Start: 2018-11-04 | End: 2018-11-15 | Stop reason: HOSPADM

## 2018-11-04 RX ORDER — AMLODIPINE BESYLATE 5 MG/1
5 TABLET ORAL NIGHTLY
Status: DISCONTINUED | OUTPATIENT
Start: 2018-11-04 | End: 2018-11-06

## 2018-11-04 RX ORDER — CLOPIDOGREL BISULFATE 75 MG/1
75 TABLET ORAL DAILY
Status: DISCONTINUED | OUTPATIENT
Start: 2018-11-05 | End: 2018-11-15 | Stop reason: HOSPADM

## 2018-11-04 RX ORDER — LISINOPRIL 10 MG/1
10 TABLET ORAL DAILY
Status: DISCONTINUED | OUTPATIENT
Start: 2018-11-05 | End: 2018-11-08

## 2018-11-04 RX ORDER — ALBUTEROL SULFATE 2.5 MG/3ML
2.5 SOLUTION RESPIRATORY (INHALATION) EVERY 6 HOURS PRN
Status: DISCONTINUED | OUTPATIENT
Start: 2018-11-04 | End: 2018-11-15 | Stop reason: HOSPADM

## 2018-11-04 RX ORDER — SODIUM CHLORIDE 0.9 % (FLUSH) 0.9 %
10 SYRINGE (ML) INJECTION EVERY 12 HOURS SCHEDULED
Status: DISCONTINUED | OUTPATIENT
Start: 2018-11-04 | End: 2018-11-15 | Stop reason: HOSPADM

## 2018-11-04 RX ORDER — CARVEDILOL 6.25 MG/1
12.5 TABLET ORAL 2 TIMES DAILY WITH MEALS
Status: DISCONTINUED | OUTPATIENT
Start: 2018-11-05 | End: 2018-11-13

## 2018-11-04 RX ORDER — ACETAMINOPHEN 325 MG/1
650 TABLET ORAL 3 TIMES DAILY
Status: DISCONTINUED | OUTPATIENT
Start: 2018-11-04 | End: 2018-11-07

## 2018-11-04 RX ORDER — HYDRALAZINE HYDROCHLORIDE 20 MG/ML
5 INJECTION INTRAMUSCULAR; INTRAVENOUS EVERY 4 HOURS PRN
Status: DISCONTINUED | OUTPATIENT
Start: 2018-11-04 | End: 2018-11-15 | Stop reason: HOSPADM

## 2018-11-04 RX ORDER — ONDANSETRON 2 MG/ML
4 INJECTION INTRAMUSCULAR; INTRAVENOUS EVERY 6 HOURS PRN
Status: DISCONTINUED | OUTPATIENT
Start: 2018-11-04 | End: 2018-11-15 | Stop reason: HOSPADM

## 2018-11-04 RX ORDER — SODIUM CHLORIDE 0.9 % (FLUSH) 0.9 %
10 SYRINGE (ML) INJECTION PRN
Status: DISCONTINUED | OUTPATIENT
Start: 2018-11-04 | End: 2018-11-15 | Stop reason: HOSPADM

## 2018-11-04 RX ORDER — LISINOPRIL 10 MG/1
10 TABLET ORAL ONCE
Status: COMPLETED | OUTPATIENT
Start: 2018-11-04 | End: 2018-11-04

## 2018-11-04 RX ORDER — ASPIRIN 81 MG/1
81 TABLET ORAL DAILY
Status: DISCONTINUED | OUTPATIENT
Start: 2018-11-05 | End: 2018-11-15 | Stop reason: HOSPADM

## 2018-11-04 RX ADMIN — DESMOPRESSIN ACETATE 80 MG: 0.2 TABLET ORAL at 23:27

## 2018-11-04 RX ADMIN — CARVEDILOL 6.25 MG: 3.12 TABLET, FILM COATED ORAL at 14:54

## 2018-11-04 RX ADMIN — Medication 10 ML: at 23:28

## 2018-11-04 RX ADMIN — ACETAMINOPHEN 650 MG: 325 TABLET, FILM COATED ORAL at 23:27

## 2018-11-04 RX ADMIN — LISINOPRIL 10 MG: 10 TABLET ORAL at 14:54

## 2018-11-04 RX ADMIN — CEFTRIAXONE 1 G: 10 INJECTION, POWDER, FOR SOLUTION INTRAVENOUS at 15:45

## 2018-11-04 RX ADMIN — AMLODIPINE BESYLATE 5 MG: 5 TABLET ORAL at 23:28

## 2018-11-04 ASSESSMENT — PAIN DESCRIPTION - ORIENTATION: ORIENTATION: RIGHT

## 2018-11-04 ASSESSMENT — PAIN SCALES - GENERAL: PAINLEVEL_OUTOF10: 0

## 2018-11-04 ASSESSMENT — PAIN DESCRIPTION - LOCATION: LOCATION: LEG

## 2018-11-04 NOTE — ED PROVIDER NOTES
Exam: Initial FINDINGS: Cardiomegaly. Pulmonary vascular congestion. Pulmonary edema. No new airspace disease. Findings suggest congestive heart failure     EKG:  Read by me in the absence of a cardiologist shows:  Atrial for ablation, rate 86, QRS duration normal, axis 34°, nonspecific ST-T wave abnormality, poor R-wave progression in septal leads, more artifact than prior EKG from 26 April 2018     Medications administered:  Medications   lisinopril (PRINIVIL;ZESTRIL) tablet 10 mg (10 mg Oral Given 11/4/18 1454)   carvedilol (COREG) tablet 6.25 mg (6.25 mg Oral Given 11/4/18 1454)   cefTRIAXone (ROCEPHIN) 1 g in sterile water 10 mL IV syringe (0 g Intravenous Stopped 11/4/18 1481)     FINAL IMPRESSION:    1. Acute confusion    2. Foul smelling urine    3.  Tibia fracture, right, closed, initial encounter         More Holbrook MD  11/04/18 1580

## 2018-11-04 NOTE — ED NOTES
Pt is alone in room. Family has left to complete errands. NP Jad Henson and charge nurse aware of patients refusal to get in bed despite coaxing. Pt remains in wheelchair at bedside.       Orin Delgado RN  11/04/18 6452

## 2018-11-05 LAB
ANION GAP SERPL CALCULATED.3IONS-SCNC: 8 MMOL/L (ref 3–16)
BUN BLDV-MCNC: 28 MG/DL (ref 7–20)
CALCIUM SERPL-MCNC: 9.2 MG/DL (ref 8.3–10.6)
CHLORIDE BLD-SCNC: 105 MMOL/L (ref 99–110)
CO2: 28 MMOL/L (ref 21–32)
CREAT SERPL-MCNC: 1.1 MG/DL (ref 0.6–1.2)
GFR AFRICAN AMERICAN: 57
GFR NON-AFRICAN AMERICAN: 47
GLUCOSE BLD-MCNC: 118 MG/DL (ref 70–99)
MAGNESIUM: 1.9 MG/DL (ref 1.8–2.4)
POTASSIUM SERPL-SCNC: 3.6 MMOL/L (ref 3.5–5.1)
SODIUM BLD-SCNC: 141 MMOL/L (ref 136–145)
URINE CULTURE, ROUTINE: NORMAL

## 2018-11-05 PROCEDURE — 1200000000 HC SEMI PRIVATE

## 2018-11-05 PROCEDURE — 2500000003 HC RX 250 WO HCPCS: Performed by: FAMILY MEDICINE

## 2018-11-05 PROCEDURE — 93010 ELECTROCARDIOGRAM REPORT: CPT | Performed by: INTERNAL MEDICINE

## 2018-11-05 PROCEDURE — 6370000000 HC RX 637 (ALT 250 FOR IP): Performed by: FAMILY MEDICINE

## 2018-11-05 PROCEDURE — 2580000003 HC RX 258: Performed by: FAMILY MEDICINE

## 2018-11-05 PROCEDURE — 80048 BASIC METABOLIC PNL TOTAL CA: CPT

## 2018-11-05 PROCEDURE — 83735 ASSAY OF MAGNESIUM: CPT

## 2018-11-05 PROCEDURE — 36415 COLL VENOUS BLD VENIPUNCTURE: CPT

## 2018-11-05 PROCEDURE — 6360000002 HC RX W HCPCS: Performed by: FAMILY MEDICINE

## 2018-11-05 PROCEDURE — 99221 1ST HOSP IP/OBS SF/LOW 40: CPT | Performed by: NURSE PRACTITIONER

## 2018-11-05 RX ADMIN — SERTRALINE 50 MG: 50 TABLET, FILM COATED ORAL at 08:33

## 2018-11-05 RX ADMIN — MORPHINE SULFATE 4 MG: 4 INJECTION INTRAVENOUS at 01:20

## 2018-11-05 RX ADMIN — Medication 10 ML: at 22:47

## 2018-11-05 RX ADMIN — LISINOPRIL 10 MG: 10 TABLET ORAL at 13:15

## 2018-11-05 RX ADMIN — CLOPIDOGREL 75 MG: 75 TABLET, FILM COATED ORAL at 08:34

## 2018-11-05 RX ADMIN — CEFTRIAXONE 1 G: 10 INJECTION, POWDER, FOR SOLUTION INTRAVENOUS at 15:40

## 2018-11-05 RX ADMIN — AMLODIPINE BESYLATE 5 MG: 5 TABLET ORAL at 22:47

## 2018-11-05 RX ADMIN — LISINOPRIL 10 MG: 10 TABLET ORAL at 08:33

## 2018-11-05 RX ADMIN — CARVEDILOL 12.5 MG: 6.25 TABLET, FILM COATED ORAL at 08:33

## 2018-11-05 RX ADMIN — CARVEDILOL 12.5 MG: 6.25 TABLET, FILM COATED ORAL at 22:50

## 2018-11-05 RX ADMIN — ENOXAPARIN SODIUM 40 MG: 100 INJECTION SUBCUTANEOUS at 14:38

## 2018-11-05 RX ADMIN — DESMOPRESSIN ACETATE 80 MG: 0.2 TABLET ORAL at 22:47

## 2018-11-05 ASSESSMENT — PAIN SCALES - GENERAL
PAINLEVEL_OUTOF10: 0
PAINLEVEL_OUTOF10: 0
PAINLEVEL_OUTOF10: 6
PAINLEVEL_OUTOF10: 3
PAINLEVEL_OUTOF10: 0
PAINLEVEL_OUTOF10: 0

## 2018-11-05 ASSESSMENT — PAIN DESCRIPTION - LOCATION: LOCATION: LEG

## 2018-11-05 ASSESSMENT — PAIN DESCRIPTION - ORIENTATION: ORIENTATION: RIGHT

## 2018-11-05 NOTE — H&P
for investigation and treatment of the above medically necessary diagnoses.       Danni Garcia MD    11/4/2018 10:39 PM

## 2018-11-05 NOTE — PROGRESS NOTES
[facility]    11/5/2018 7:28 PM        Name: Dyan Urena . Admitted: 11/4/2018  Primary Care Provider: Noe Larios MD (Tel: [phone])    Brief Course:        CC: confusion    Subjective: Junius Navarro     Pleasant  No family at bedside  Denies any complaint     Reviewed interval ancillary notes    Current Medications    acetaminophen (TYLENOL) tablet 650 mg TID   albuterol (PROVENTIL) nebulizer solution 2.5 mg Q6H PRN   aspirin EC tablet 81 mg Daily   atorvastatin (LIPITOR) tablet 80 mg Nightly   carvedilol (COREG) tablet 12.5 mg BID WC   furosemide (LASIX) tablet 20 mg Daily PRN   lisinopril (PRINIVIL;ZESTRIL) tablet 10 mg Daily   sertraline (ZOLOFT) tablet 50 mg Daily   sodium chloride flush 0.9 % injection 10 mL 2 times per day   sodium chloride flush 0.9 % injection 10 mL PRN   magnesium hydroxide (MILK OF MAGNESIA) 400 MG/5ML suspension 30 mL Daily PRN   ondansetron (ZOFRAN) injection 4 mg Q6H PRN   enoxaparin (LOVENOX) injection 40 mg Daily   morphine (PF) injection 4 mg Q4H PRN   traMADol (ULTRAM) tablet 50 mg Q4H PRN   clopidogrel (PLAVIX) tablet 75 mg Daily   amLODIPine (NORVASC) tablet 5 mg Nightly   hydrALAZINE (APRESOLINE) injection 5 mg Q4H PRN   cefTRIAXone (ROCEPHIN) 1 g in sterile water 10 mL IV syringe Q24H       Objective:  /61   Pulse 93   Temp 98.6 °F (37 °C) (Oral)   Resp 16   Ht 5' 7\" (1.702 m)   Wt 122 lb (55.3 kg)   SpO2 93%   BMI 19.11 kg/m²     Intake/Output Summary (Last 24 hours) at 11/05/18 1928  Last data filed at 11/05/18 1800   Gross per 24 hour   Intake                0 ml   Output              300 ml   Net             -300 ml    Wt Readings from Last 3 Encounters:   11/05/18 122 lb (55.3 kg)   05/30/18 132 lb (59.9 kg)   05/09/18 131 lb 11.2 oz (59.7 kg)     Rt arm and leg weakness  Oriented to place and person  General appearance:  Appears
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
## 2018-11-05 NOTE — ED NOTES
Patient on the monitor at this time, noted by RN on 4T.  Electronically signed by Toni Jones RN on 11/4/2018 at 10:05 PM       Toni Jones RN  11/04/18 1429
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
## 2018-11-05 NOTE — ED NOTES
Pt's daughter continues to insist that pt be moved to [facility] despite being told primarily that she's unable to be placed on that unit. Clinical coordinator aware of situation as well. Pt's daughter reportedly went up to 4T as well and is unhappy with the room assignment as she is \"next to an MRDD patient. \" Pt back down in the ER talking with charge LEO Glass regarding room assignment.       Laura Burnette RN  11/04/18 9817

## 2018-11-06 PROCEDURE — 2580000003 HC RX 258: Performed by: FAMILY MEDICINE

## 2018-11-06 PROCEDURE — 97530 THERAPEUTIC ACTIVITIES: CPT

## 2018-11-06 PROCEDURE — 6360000002 HC RX W HCPCS: Performed by: FAMILY MEDICINE

## 2018-11-06 PROCEDURE — G8987 SELF CARE CURRENT STATUS: HCPCS

## 2018-11-06 PROCEDURE — 6370000000 HC RX 637 (ALT 250 FOR IP): Performed by: FAMILY MEDICINE

## 2018-11-06 PROCEDURE — 97166 OT EVAL MOD COMPLEX 45 MIN: CPT

## 2018-11-06 PROCEDURE — 2580000003 HC RX 258: Performed by: PHYSICIAN ASSISTANT

## 2018-11-06 PROCEDURE — 1200000000 HC SEMI PRIVATE

## 2018-11-06 PROCEDURE — G8978 MOBILITY CURRENT STATUS: HCPCS

## 2018-11-06 PROCEDURE — G8979 MOBILITY GOAL STATUS: HCPCS

## 2018-11-06 PROCEDURE — 97535 SELF CARE MNGMENT TRAINING: CPT

## 2018-11-06 PROCEDURE — 97162 PT EVAL MOD COMPLEX 30 MIN: CPT

## 2018-11-06 PROCEDURE — G8988 SELF CARE GOAL STATUS: HCPCS

## 2018-11-06 RX ORDER — SODIUM CHLORIDE 9 MG/ML
INJECTION, SOLUTION INTRAVENOUS CONTINUOUS
Status: DISPENSED | OUTPATIENT
Start: 2018-11-06 | End: 2018-11-06

## 2018-11-06 RX ADMIN — Medication 10 ML: at 10:14

## 2018-11-06 RX ADMIN — SODIUM CHLORIDE: 9 INJECTION, SOLUTION INTRAVENOUS at 21:57

## 2018-11-06 RX ADMIN — CLOPIDOGREL 75 MG: 75 TABLET, FILM COATED ORAL at 10:13

## 2018-11-06 RX ADMIN — CARVEDILOL 12.5 MG: 6.25 TABLET, FILM COATED ORAL at 10:13

## 2018-11-06 RX ADMIN — CARVEDILOL 12.5 MG: 6.25 TABLET, FILM COATED ORAL at 18:39

## 2018-11-06 RX ADMIN — LISINOPRIL 10 MG: 10 TABLET ORAL at 10:13

## 2018-11-06 RX ADMIN — TRAMADOL HYDROCHLORIDE 50 MG: 50 TABLET, FILM COATED ORAL at 10:54

## 2018-11-06 RX ADMIN — ENOXAPARIN SODIUM 40 MG: 100 INJECTION SUBCUTANEOUS at 10:13

## 2018-11-06 RX ADMIN — ASPIRIN 81 MG: 81 TABLET, COATED ORAL at 10:13

## 2018-11-06 RX ADMIN — ACETAMINOPHEN 650 MG: 325 TABLET, FILM COATED ORAL at 10:13

## 2018-11-06 RX ADMIN — Medication 10 ML: at 21:58

## 2018-11-06 RX ADMIN — SERTRALINE 50 MG: 50 TABLET, FILM COATED ORAL at 10:13

## 2018-11-06 ASSESSMENT — PAIN SCALES - GENERAL
PAINLEVEL_OUTOF10: 9
PAINLEVEL_OUTOF10: 7
PAINLEVEL_OUTOF10: 0
PAINLEVEL_OUTOF10: 6
PAINLEVEL_OUTOF10: 0
PAINLEVEL_OUTOF10: 6
PAINLEVEL_OUTOF10: 0
PAINLEVEL_OUTOF10: 2
PAINLEVEL_OUTOF10: 0
PAINLEVEL_OUTOF10: 0

## 2018-11-06 ASSESSMENT — PAIN DESCRIPTION - ORIENTATION
ORIENTATION: RIGHT
ORIENTATION: RIGHT

## 2018-11-06 ASSESSMENT — PAIN DESCRIPTION - LOCATION
LOCATION: LEG
LOCATION: LEG

## 2018-11-06 NOTE — PROGRESS NOTES
Speech Language Pathology  Clinical Swallow Evaluation orders received. Pt is well known to speech at [facility] with most recent MBS taking place 4/13/18 with the following results:    Results indicate severe oropharyngeal dysphagia with direct SILENT aspiration with delayed cough noted with thin and nectar thick liquids. Intermittent laryngeal penetration with intermittent audible cough was also noted with HONEY thick liquids in isolation. Evidence of mechanism fatigue was noted with increasing textures and over successive po trials, resulting in episodic aspiration with HONEY thick liquids given in combination with solid textures, as mechanism fatigue occurred. Severely impaired bolus control, A-P propulsion, and oropharyngeal bolus transfer noted with increased textures. This results in prolonged and \"effortful\" oral clearing with solid textures, and directly contributes to mechanism fatigue and subsequently reduced airway protection during the swallow with honey thick liquids. Mechanism fatigue with solid textures likely contributed to symptoms of increased \"coughing/choking\" on regular texture solids and honey thick liquids following Pt's diet advance to regular textures per request. Use of previously recommended compensatory strategies of chin tuck and nosey cup were implemented during this study and were noted to be ineffective in eliminating laryngeal penetration or aspiration with any texture. Conversely, use of chin tuck is NO LONGER RECOMMENDED due to impact of impaired A-P propulsion and effortful oropharyngeal bolus transfer on mechanism fatigue and airway protection. Neutral chin positioning is recommended with all po. Symptoms of delayed coughing episodes and nose running were noted to be directly related to episodes of SILENT penetration and SILENT aspiration with variable textures over successive po trials.  Although the Pt tolerated HONEY thick liquids with less episodes of penetration/aspiration

## 2018-11-06 NOTE — PROGRESS NOTES
memory;Decreased recall of recent events  Insights: Not aware of deficits  Initiation: Requires cues for some  Sequencing: Requires cues for all        Sensation  Overall Sensation Status:  (unable to assess due to pt's cognition)        LUE AROM (degrees)  LUE AROM : WFL  RUE AROM (degrees)  RUE General AROM: Trace motion at shoulder. Did not activate distally on request.  Arm flexor pattern  LUE Strength  LUE Strength Comment: At least 3/5 observed. Unable to follow commands for MMT  RUE Strength  RUE Strength Comment: trace activation at shoulder                  Assessment   Performance deficits / Impairments: Decreased functional mobility ; Decreased ADL status; Decreased safe awareness;Decreased balance;Decreased endurance  Assessment: Patient presents with the above deficits impacting occupational performance and functioning below baseline level. Recommend skilled OT to address deficts and promote functional independence. Treatment Diagnosis: Decreased functional mobility, decreased ADLs, endurance,safety awareness, balance associated with tibial plateau fracture (NWB with knee immobilizer)  Prognosis: Good  Decision Making: Medium Complexity  History: Prior level, cogntion  Exam: as above  Assistance / Modification: Assist of 2 with mobility  Patient Education: OT role, NWB, discharge  Barriers to Learning: cognition  REQUIRES OT FOLLOW UP: Yes  Activity Tolerance  Activity Tolerance: Treatment limited secondary to decreased cognition  Safety Devices  Safety Devices in place: Yes  Type of devices: All fall risk precautions in place;Call light within reach; Chair alarm in place; Left in chair;Patient at risk for falls;Gait belt;Nurse notified (leroy)         Plan   Plan  Times per week: 5-7  Times per day: Daily  Current Treatment Recommendations: Functional Mobility Training, Balance Training, Self-Care / ADL, Cognitive Reorientation, Safety Education & Training, Endurance Training    G-Code  OT

## 2018-11-07 LAB
ANION GAP SERPL CALCULATED.3IONS-SCNC: 6 MMOL/L (ref 3–16)
BUN BLDV-MCNC: 27 MG/DL (ref 7–20)
CALCIUM SERPL-MCNC: 9.1 MG/DL (ref 8.3–10.6)
CHLORIDE BLD-SCNC: 106 MMOL/L (ref 99–110)
CO2: 28 MMOL/L (ref 21–32)
CREAT SERPL-MCNC: 1.2 MG/DL (ref 0.6–1.2)
GFR AFRICAN AMERICAN: 52
GFR NON-AFRICAN AMERICAN: 43
GLUCOSE BLD-MCNC: 100 MG/DL (ref 70–99)
POTASSIUM SERPL-SCNC: 4 MMOL/L (ref 3.5–5.1)
SODIUM BLD-SCNC: 140 MMOL/L (ref 136–145)

## 2018-11-07 PROCEDURE — 1200000000 HC SEMI PRIVATE

## 2018-11-07 PROCEDURE — 2580000003 HC RX 258: Performed by: FAMILY MEDICINE

## 2018-11-07 PROCEDURE — 80048 BASIC METABOLIC PNL TOTAL CA: CPT

## 2018-11-07 PROCEDURE — 6370000000 HC RX 637 (ALT 250 FOR IP): Performed by: FAMILY MEDICINE

## 2018-11-07 PROCEDURE — 36415 COLL VENOUS BLD VENIPUNCTURE: CPT

## 2018-11-07 PROCEDURE — 6370000000 HC RX 637 (ALT 250 FOR IP): Performed by: PHYSICIAN ASSISTANT

## 2018-11-07 PROCEDURE — 97530 THERAPEUTIC ACTIVITIES: CPT

## 2018-11-07 PROCEDURE — 6360000002 HC RX W HCPCS: Performed by: FAMILY MEDICINE

## 2018-11-07 PROCEDURE — 97535 SELF CARE MNGMENT TRAINING: CPT

## 2018-11-07 RX ORDER — ACETAMINOPHEN 325 MG/1
650 TABLET ORAL EVERY 4 HOURS PRN
Status: DISCONTINUED | OUTPATIENT
Start: 2018-11-07 | End: 2018-11-15 | Stop reason: HOSPADM

## 2018-11-07 RX ADMIN — DESMOPRESSIN ACETATE 80 MG: 0.2 TABLET ORAL at 20:13

## 2018-11-07 RX ADMIN — LISINOPRIL 10 MG: 10 TABLET ORAL at 09:31

## 2018-11-07 RX ADMIN — CLOPIDOGREL 75 MG: 75 TABLET, FILM COATED ORAL at 09:31

## 2018-11-07 RX ADMIN — ASPIRIN 81 MG: 81 TABLET, COATED ORAL at 09:31

## 2018-11-07 RX ADMIN — Medication 10 ML: at 20:13

## 2018-11-07 RX ADMIN — ENOXAPARIN SODIUM 40 MG: 100 INJECTION SUBCUTANEOUS at 09:30

## 2018-11-07 RX ADMIN — SERTRALINE 50 MG: 50 TABLET, FILM COATED ORAL at 09:31

## 2018-11-07 RX ADMIN — CARVEDILOL 12.5 MG: 6.25 TABLET, FILM COATED ORAL at 09:30

## 2018-11-07 RX ADMIN — ACETAMINOPHEN 650 MG: 325 TABLET, FILM COATED ORAL at 09:31

## 2018-11-07 RX ADMIN — ACETAMINOPHEN 650 MG: 325 TABLET, FILM COATED ORAL at 16:49

## 2018-11-07 ASSESSMENT — PAIN SCALES - GENERAL
PAINLEVEL_OUTOF10: 0
PAINLEVEL_OUTOF10: 3
PAINLEVEL_OUTOF10: 4
PAINLEVEL_OUTOF10: 0
PAINLEVEL_OUTOF10: 4
PAINLEVEL_OUTOF10: 0
PAINLEVEL_OUTOF10: 2
PAINLEVEL_OUTOF10: 0

## 2018-11-07 NOTE — PROGRESS NOTES
Physical Therapy  Facility/Department: [address]  Daily Treatment Note  NAME: Umer Waggoner  : 10/14/1932  MRN: [id number]    Date of Service: 2018    Discharge Recommendations: Umer Waggoner scored a 9/24 on the AM-PAC short mobility form. Current research shows that an AM-PAC score of 17 or less is typically not associated with a discharge to the patient's home setting. Based on the patients AM-PAC score and their current functional mobility deficits, it is recommended that the patient have 3-5 sessions per week of Physical Therapy at d/c to increase the patients independence. PT Equipment Recommendations  Equipment Needed: No    Patient Diagnosis(es): The primary encounter diagnosis was Acute confusion. Diagnoses of Foul smelling urine and Tibia fracture, right, closed, initial encounter were also pertinent to this visit. has a past medical history of CAD (coronary artery disease); CVA (cerebral vascular accident) ([address] and Hypertension. has a past surgical history that includes Coronary angioplasty with stent () and Cosmetic surgery. Restrictions  Restrictions/Precautions  Restrictions/Precautions: Weight Bearing, Fall Risk (High fall risk)  Required Braces or Orthoses?: Yes  Lower Extremity Weight Bearing Restrictions  Right Lower Extremity Weight Bearing: Non Weight Bearing  Required Braces or Orthoses  Right Lower Extremity Brace: Knee Immobilizer  Position Activity Restriction  Other position/activity restrictions: : Umer Waggoner is a 80 y.o. female who presents emergency Department with concern for acute confusion. The patient does have baseline history of dementia as well as right-sided weakness after CVA. Her  is critically ill in the hospital now. According to her daughter, the patient was belligerent last night and screaming that her  was having an affair. The nursing staff was concerned that the patient may have an acute illness as well.

## 2018-11-08 PROCEDURE — 1200000000 HC SEMI PRIVATE

## 2018-11-08 PROCEDURE — 6360000002 HC RX W HCPCS: Performed by: FAMILY MEDICINE

## 2018-11-08 PROCEDURE — 97530 THERAPEUTIC ACTIVITIES: CPT

## 2018-11-08 PROCEDURE — 2580000003 HC RX 258: Performed by: FAMILY MEDICINE

## 2018-11-08 PROCEDURE — 6370000000 HC RX 637 (ALT 250 FOR IP): Performed by: FAMILY MEDICINE

## 2018-11-08 PROCEDURE — 97535 SELF CARE MNGMENT TRAINING: CPT

## 2018-11-08 RX ORDER — LISINOPRIL 20 MG/1
20 TABLET ORAL DAILY
Status: DISCONTINUED | OUTPATIENT
Start: 2018-11-09 | End: 2018-11-15 | Stop reason: HOSPADM

## 2018-11-08 RX ADMIN — CARVEDILOL 12.5 MG: 6.25 TABLET, FILM COATED ORAL at 08:23

## 2018-11-08 RX ADMIN — SERTRALINE 50 MG: 50 TABLET, FILM COATED ORAL at 08:24

## 2018-11-08 RX ADMIN — TRAMADOL HYDROCHLORIDE 50 MG: 50 TABLET, FILM COATED ORAL at 21:22

## 2018-11-08 RX ADMIN — DESMOPRESSIN ACETATE 80 MG: 0.2 TABLET ORAL at 21:21

## 2018-11-08 RX ADMIN — Medication 10 ML: at 08:25

## 2018-11-08 RX ADMIN — ASPIRIN 81 MG: 81 TABLET, COATED ORAL at 08:24

## 2018-11-08 RX ADMIN — Medication 10 ML: at 21:21

## 2018-11-08 RX ADMIN — LISINOPRIL 10 MG: 10 TABLET ORAL at 08:24

## 2018-11-08 RX ADMIN — TRAMADOL HYDROCHLORIDE 50 MG: 50 TABLET, FILM COATED ORAL at 16:45

## 2018-11-08 RX ADMIN — CLOPIDOGREL 75 MG: 75 TABLET, FILM COATED ORAL at 08:24

## 2018-11-08 RX ADMIN — CARVEDILOL 12.5 MG: 6.25 TABLET, FILM COATED ORAL at 16:45

## 2018-11-08 RX ADMIN — ENOXAPARIN SODIUM 40 MG: 100 INJECTION SUBCUTANEOUS at 08:24

## 2018-11-08 ASSESSMENT — PAIN SCALES - WONG BAKER
WONGBAKER_NUMERICALRESPONSE: 6
WONGBAKER_NUMERICALRESPONSE: 4

## 2018-11-08 ASSESSMENT — PAIN SCALES - GENERAL
PAINLEVEL_OUTOF10: 3
PAINLEVEL_OUTOF10: 6
PAINLEVEL_OUTOF10: 0
PAINLEVEL_OUTOF10: 5
PAINLEVEL_OUTOF10: 0
PAINLEVEL_OUTOF10: 0

## 2018-11-08 ASSESSMENT — PAIN DESCRIPTION - LOCATION: LOCATION: LEG

## 2018-11-08 ASSESSMENT — PAIN DESCRIPTION - ORIENTATION: ORIENTATION: RIGHT

## 2018-11-08 NOTE — PROGRESS NOTES
No speech or motor deficits  Psych: Normal affect. Alert and oriented in time, place and person  Skin: Warm, dry, normal turgor    Labs and Tests:  CBC:   No results for input(s): WBC, HGB, PLT in the last 72 hours. BMP:    Recent Labs      11/05/18   2206  11/07/18   0748   NA  141  140   K  3.6  4.0   CL  105  106   CO2  28  28   BUN  28*  27*   CREATININE  1.1  1.2   GLUCOSE  118*  100*     Hepatic: No results for input(s): AST, ALT, ALB, BILITOT, ALKPHOS in the last 72 hours. XR KNEE RIGHT (1-2 VIEWS)   Final Result   Healing fracture of the proximal tibia. No acute abnormality identified. XR TIBIA FIBULA RIGHT (2 VIEWS)   Final Result   Transverse fracture the proximal tibia with evidence of callus formation,   likely subacute to chronic. CT Head WO Contrast   Final Result   No acute intracranial abnormality. Diffuse atrophic changes with findings suggesting chronic microvascular   ischemia and evidence for an old left frontal lobe infarct. XR CHEST PORTABLE   Final Result   Findings suggest congestive heart failure               Problem List  Active Problems:    Right medial tibial plateau fracture, closed, initial encounter  Resolved Problems:    * No resolved hospital problems. *       Assessment & Plan:   1. R Tibial fracture -non surgical. Needs therapy, placement. Non weightbearing and needs to wear immobilizer. Needs SNF. Dc when arranged. 2. Hypertension-Bp ok. 3. Bacteruria-cultures negative. abx stopped over the weekend. 4. Dysphagia-daughter refusing eval and dysphagia diet. 4. H/o CVA-R hemiplegia. 5. afib-on asa.        Diet: DIET GENERAL;  Dietary Nutrition Supplements: Standard High Calorie Oral Supplement  Code:Limited  DVT PPX: saman Roberts PA-C   11/8/2018 3:46 PM

## 2018-11-08 NOTE — PROGRESS NOTES
The daughter reports that her urine has been foul smelling and maybe this is the source. The patient is alert to person but not place or time. She is denying any complaints. She does not want to be seen or evaluated. Subjective   General  Chart Reviewed: Yes  Response To Previous Treatment: Patient with no complaints from previous session. Family / Caregiver Present: Yes (dtr in at end of session)  Subjective  Subjective: Pt denying pain at beginning of session. During mobility, pt reporting pain in RLE-not providing pain rating. RN aware and when pt asked if she wanted medication, dtr reporting to hold off on medication as \"it changes her personality\"  General Comment  Comments: Pt seated in recliner upon arrival with knee immobilizer on. Pt agreeable to PT/OT session. Pain Screening  Patient Currently in Pain: Yes (denying initially; reporting pain in RLE during mobility-not providing pain rating)  Pain Assessment  Pain Assessment: Faces  Loera-Baker Pain Rating: Hurts little more  Vital Signs  Patient Currently in Pain: Yes (denying initially; reporting pain in RLE during mobility-not providing pain rating)          Objective   Bed mobility  Sit to Supine: Maximum assistance  Transfers  Sit to Stand: Dependent/Total  Stand to sit: Dependent/Total  Stand Pivot Transfers: Dependent/Total  Comment: Max+Mod A for STS and pivot recliner>BSC>EOB. Pt required VC and mod A to maintain NWBing of RLE, especially during transfer from recliner>BSC. Ambulation  Ambulation?: No  Stairs/Curb  Stairs?: No     Balance  Posture: Fair  Sitting - Static: Good  Sitting - Dynamic: Good (SBA seated at EOB and at [facility] for toileting ~10 minutes total)  Standing - Static: Poor (Max Ax1 static standing while OT performing pericare and donning brief)  Standing - Dynamic: Poor (2 person assist for transfers)         Assessment   Body structures, Functions, Activity limitations: Decreased functional mobility ; Decreased

## 2018-11-08 NOTE — PROGRESS NOTES
Daughter refusing to follow soft diet order, the nurse heard pt chocking while daughter was giving solid foods to pt.

## 2018-11-09 LAB
BLOOD CULTURE, ROUTINE: NORMAL
CULTURE, BLOOD 2: NORMAL

## 2018-11-09 PROCEDURE — 1200000000 HC SEMI PRIVATE

## 2018-11-09 PROCEDURE — 2580000003 HC RX 258: Performed by: FAMILY MEDICINE

## 2018-11-09 PROCEDURE — 6370000000 HC RX 637 (ALT 250 FOR IP): Performed by: PHYSICIAN ASSISTANT

## 2018-11-09 PROCEDURE — 6360000002 HC RX W HCPCS: Performed by: FAMILY MEDICINE

## 2018-11-09 PROCEDURE — APPNB30 APP NON BILLABLE TIME 0-30 MINS: Performed by: NURSE PRACTITIONER

## 2018-11-09 PROCEDURE — 6370000000 HC RX 637 (ALT 250 FOR IP): Performed by: FAMILY MEDICINE

## 2018-11-09 RX ADMIN — Medication 10 ML: at 09:32

## 2018-11-09 RX ADMIN — DESMOPRESSIN ACETATE 80 MG: 0.2 TABLET ORAL at 20:12

## 2018-11-09 RX ADMIN — LISINOPRIL 20 MG: 20 TABLET ORAL at 09:32

## 2018-11-09 RX ADMIN — ENOXAPARIN SODIUM 40 MG: 100 INJECTION SUBCUTANEOUS at 09:32

## 2018-11-09 RX ADMIN — SERTRALINE 50 MG: 50 TABLET, FILM COATED ORAL at 09:32

## 2018-11-09 RX ADMIN — CARVEDILOL 12.5 MG: 6.25 TABLET, FILM COATED ORAL at 09:31

## 2018-11-09 RX ADMIN — CLOPIDOGREL 75 MG: 75 TABLET, FILM COATED ORAL at 09:32

## 2018-11-09 RX ADMIN — CARVEDILOL 12.5 MG: 6.25 TABLET, FILM COATED ORAL at 17:32

## 2018-11-09 RX ADMIN — Medication 10 ML: at 20:12

## 2018-11-09 RX ADMIN — TRAMADOL HYDROCHLORIDE 50 MG: 50 TABLET, FILM COATED ORAL at 09:32

## 2018-11-09 RX ADMIN — ASPIRIN 81 MG: 81 TABLET, COATED ORAL at 09:32

## 2018-11-09 RX ADMIN — TRAMADOL HYDROCHLORIDE 50 MG: 50 TABLET, FILM COATED ORAL at 20:14

## 2018-11-09 ASSESSMENT — PAIN DESCRIPTION - FREQUENCY: FREQUENCY: CONTINUOUS

## 2018-11-09 ASSESSMENT — PAIN SCALES - GENERAL
PAINLEVEL_OUTOF10: 3
PAINLEVEL_OUTOF10: 0
PAINLEVEL_OUTOF10: 6
PAINLEVEL_OUTOF10: 0
PAINLEVEL_OUTOF10: 0
PAINLEVEL_OUTOF10: 5

## 2018-11-09 ASSESSMENT — PAIN DESCRIPTION - PAIN TYPE: TYPE: ACUTE PAIN

## 2018-11-09 ASSESSMENT — PAIN DESCRIPTION - ONSET: ONSET: ON-GOING

## 2018-11-09 ASSESSMENT — PAIN SCALES - WONG BAKER
WONGBAKER_NUMERICALRESPONSE: 2
WONGBAKER_NUMERICALRESPONSE: 2

## 2018-11-09 ASSESSMENT — PAIN DESCRIPTION - DESCRIPTORS: DESCRIPTORS: ACHING

## 2018-11-09 ASSESSMENT — PAIN DESCRIPTION - ORIENTATION: ORIENTATION: RIGHT

## 2018-11-09 ASSESSMENT — PAIN DESCRIPTION - LOCATION: LOCATION: LEG

## 2018-11-09 NOTE — PROGRESS NOTES
Open sore noted on right upper arm. Unknown source. Mepilex border applied. Meeta Edwards, [address]us notified. Stated she will look at it.

## 2018-11-09 NOTE — PROGRESS NOTES
[facility] Orthopedic Surgery   Progress Note    CHIEF COMPLAINT/DIAGNOSIS:  Subacute nondisplaced RIGHT proximal tibial fracture    SUBJECTIVE: Daughter requested Orthopaedics follow up. Multiple questions asked/answered. Daughter understands fracture, need for knee immobilizer, non-weight bearing status and to schedule follow up with Dr. Rosalia Kaiser in 4 weeks.          ASHLEY SILVA, CNP  11/9/2018  10:57 AM    .

## 2018-11-10 PROCEDURE — G8979 MOBILITY GOAL STATUS: HCPCS

## 2018-11-10 PROCEDURE — 6360000002 HC RX W HCPCS: Performed by: FAMILY MEDICINE

## 2018-11-10 PROCEDURE — 97530 THERAPEUTIC ACTIVITIES: CPT

## 2018-11-10 PROCEDURE — G8978 MOBILITY CURRENT STATUS: HCPCS

## 2018-11-10 PROCEDURE — 6370000000 HC RX 637 (ALT 250 FOR IP): Performed by: FAMILY MEDICINE

## 2018-11-10 PROCEDURE — 1200000000 HC SEMI PRIVATE

## 2018-11-10 PROCEDURE — 2580000003 HC RX 258: Performed by: FAMILY MEDICINE

## 2018-11-10 PROCEDURE — 6370000000 HC RX 637 (ALT 250 FOR IP): Performed by: PHYSICIAN ASSISTANT

## 2018-11-10 RX ADMIN — CARVEDILOL 12.5 MG: 6.25 TABLET, FILM COATED ORAL at 17:42

## 2018-11-10 RX ADMIN — SERTRALINE 50 MG: 50 TABLET, FILM COATED ORAL at 10:31

## 2018-11-10 RX ADMIN — ASPIRIN 81 MG: 81 TABLET, COATED ORAL at 10:31

## 2018-11-10 RX ADMIN — DESMOPRESSIN ACETATE 80 MG: 0.2 TABLET ORAL at 20:37

## 2018-11-10 RX ADMIN — CARVEDILOL 12.5 MG: 6.25 TABLET, FILM COATED ORAL at 10:31

## 2018-11-10 RX ADMIN — Medication 10 ML: at 20:37

## 2018-11-10 RX ADMIN — CLOPIDOGREL 75 MG: 75 TABLET, FILM COATED ORAL at 10:31

## 2018-11-10 RX ADMIN — TRAMADOL HYDROCHLORIDE 50 MG: 50 TABLET, FILM COATED ORAL at 17:40

## 2018-11-10 RX ADMIN — LISINOPRIL 20 MG: 20 TABLET ORAL at 10:31

## 2018-11-10 RX ADMIN — ENOXAPARIN SODIUM 40 MG: 100 INJECTION SUBCUTANEOUS at 10:31

## 2018-11-10 ASSESSMENT — PAIN DESCRIPTION - ONSET: ONSET: ON-GOING

## 2018-11-10 ASSESSMENT — PAIN SCALES - GENERAL
PAINLEVEL_OUTOF10: 0
PAINLEVEL_OUTOF10: 8
PAINLEVEL_OUTOF10: 0
PAINLEVEL_OUTOF10: 2

## 2018-11-10 ASSESSMENT — PAIN SCALES - WONG BAKER
WONGBAKER_NUMERICALRESPONSE: 4
WONGBAKER_NUMERICALRESPONSE: 6

## 2018-11-10 ASSESSMENT — PAIN DESCRIPTION - DESCRIPTORS: DESCRIPTORS: ACHING

## 2018-11-10 ASSESSMENT — PAIN DESCRIPTION - FREQUENCY: FREQUENCY: CONTINUOUS

## 2018-11-10 ASSESSMENT — PAIN DESCRIPTION - LOCATION: LOCATION: LEG

## 2018-11-10 ASSESSMENT — PAIN DESCRIPTION - ORIENTATION: ORIENTATION: RIGHT

## 2018-11-10 ASSESSMENT — PAIN DESCRIPTION - PAIN TYPE: TYPE: ACUTE PAIN

## 2018-11-10 NOTE — PROGRESS NOTES
Physical Therapy  Facility/Department: [address] ONCOLOGY  Daily Treatment Note  NAME: Kell Romero  : 10/14/1932  MRN: [id number]    Date of Service: 11/10/2018    Discharge Recommendations:    Kell Romero scored a 9/24 on the AM-PAC short mobility form. Current research shows that an AM-PAC score of 17 or less is typically not associated with a discharge to the patient's home setting. Based on the patients AM-PAC score and their current functional mobility deficits, it is recommended that the patient have 5-7 sessions per week of Physical Therapy at d/c to increase the patients independence. PT Equipment Recommendations  Equipment Needed: No    Patient Diagnosis(es): The primary encounter diagnosis was Acute confusion. Diagnoses of Foul smelling urine and Tibia fracture, right, closed, initial encounter were also pertinent to this visit. has a past medical history of CAD (coronary artery disease); CVA (cerebral vascular accident) ([address] and Hypertension. has a past surgical history that includes Coronary angioplasty with stent () and Cosmetic surgery. Restrictions  Restrictions/Precautions  Restrictions/Precautions: Weight Bearing, Fall Risk (High)  Required Braces or Orthoses?: Yes  Lower Extremity Weight Bearing Restrictions  Right Lower Extremity Weight Bearing: Non Weight Bearing  Required Braces or Orthoses  Right Lower Extremity Brace: Knee Immobilizer  Position Activity Restriction  Other position/activity restrictions: : Kell Romero is a 80 y.o. female who presents emergency Department with concern for acute confusion. The patient does have baseline history of dementia as well as right-sided weakness after CVA. Her  is critically ill in the hospital now. According to her daughter, the patient was belligerent last night and screaming that her  was having an affair.   The nursing staff was concerned that the patient may have an acute illness as

## 2018-11-10 NOTE — PROGRESS NOTES
SNF. Dc when placement is arranged. Hypertension - Now adequately controlled. Bp was up, lisinopril was increased to 20 mg. Daughter requesting Dr Nellie Cage see her to manage BP meds. I did discuss case with Dr Nellie Cage. Bacteruria-cultures negative. abx stopped over the weekend. Dysphagia-daughter refusing eval and dysphagia diet. H/o CVA-R hemiplegia. Afib-on asa. From previous provider,   \"I had a lengthy discussion with  and daughter this am. Daughter only wants to discuss her mothers care in her fathers room. There was a sign on the door stating nobody was to come in the room until 2 pm because she and her father needed to sleep. I went in to discuss that her mother was ready for discharge. She says no providers are coming to talk to her. I spoke to her Tuesday. She was not in the room or her dads room yesterday. Her voicemail is full. I discussed with her that we cannot treat both her mom and her father as one person in regards to discharge. She wants them to go to the same place. We also discussed that providers round in the am and staff cannot delay visits until after 2. Daughter requesting ortho and Dr Nellie Cage to come speak with her. She has lots of questions and goes from topic to topic in rapid sequence. Patient is medically stable for discharge. \"      Diet: DIET GENERAL;  Dietary Nutrition Supplements: Standard High Calorie Oral Supplement  Code:Limited  DVT PPX: lovenox.        Taylor Abarca MD   11/10/2018 8:42 AM

## 2018-11-10 NOTE — PROGRESS NOTES
0730 :  Pt resting in bed, no s/s of distress. Shift handoff completed with Lurdes Chiu at bedside. 0845: Pt resting in bed, no s/s of distress. Vitals stable at this time. Fall precautions in place. 1330: Pt up to chair with PT. Tolerated fairly well.     1500: Pt assisted to visit with her  in room. 1700: Pt assisted back to bed. Brief changed. Smear of stool noted. pericare provided    9771: Pt called out to nurse phone stating \"medicine\". Upon entering the room she is capellan her right leg and states \"MEDICINE\". Asked patient if she would like some pain medicine pt states \"yes\". Ultram administered as ordered. 1800: Pt assisted to eat dinner tray. Pt tolerated mashed potatoes and ensure with out straw well. Fall precautions in place.

## 2018-11-10 NOTE — PROGRESS NOTES
Shift assessment done. See flow sheets. Call light within reach. Patient mario alberto needs at this time.  Will continue to monitor

## 2018-11-11 PROCEDURE — 6370000000 HC RX 637 (ALT 250 FOR IP): Performed by: FAMILY MEDICINE

## 2018-11-11 PROCEDURE — 2580000003 HC RX 258: Performed by: FAMILY MEDICINE

## 2018-11-11 PROCEDURE — 6370000000 HC RX 637 (ALT 250 FOR IP): Performed by: PHYSICIAN ASSISTANT

## 2018-11-11 PROCEDURE — 1200000000 HC SEMI PRIVATE

## 2018-11-11 PROCEDURE — 6360000002 HC RX W HCPCS: Performed by: FAMILY MEDICINE

## 2018-11-11 RX ADMIN — SERTRALINE 50 MG: 50 TABLET, FILM COATED ORAL at 09:14

## 2018-11-11 RX ADMIN — TRAMADOL HYDROCHLORIDE 50 MG: 50 TABLET, FILM COATED ORAL at 20:18

## 2018-11-11 RX ADMIN — CLOPIDOGREL 75 MG: 75 TABLET, FILM COATED ORAL at 09:14

## 2018-11-11 RX ADMIN — Medication 10 ML: at 20:18

## 2018-11-11 RX ADMIN — CARVEDILOL 12.5 MG: 6.25 TABLET, FILM COATED ORAL at 09:13

## 2018-11-11 RX ADMIN — CARVEDILOL 12.5 MG: 6.25 TABLET, FILM COATED ORAL at 18:16

## 2018-11-11 RX ADMIN — DESMOPRESSIN ACETATE 80 MG: 0.2 TABLET ORAL at 20:18

## 2018-11-11 RX ADMIN — ENOXAPARIN SODIUM 40 MG: 100 INJECTION SUBCUTANEOUS at 09:13

## 2018-11-11 RX ADMIN — ASPIRIN 81 MG: 81 TABLET, COATED ORAL at 09:14

## 2018-11-11 RX ADMIN — LISINOPRIL 20 MG: 20 TABLET ORAL at 09:14

## 2018-11-11 ASSESSMENT — PAIN DESCRIPTION - LOCATION
LOCATION: LEG
LOCATION: LEG

## 2018-11-11 ASSESSMENT — PAIN SCALES - GENERAL
PAINLEVEL_OUTOF10: 4
PAINLEVEL_OUTOF10: 2

## 2018-11-11 ASSESSMENT — PAIN DESCRIPTION - PAIN TYPE
TYPE: ACUTE PAIN
TYPE: ACUTE PAIN

## 2018-11-11 ASSESSMENT — PAIN DESCRIPTION - ORIENTATION
ORIENTATION: RIGHT
ORIENTATION: RIGHT

## 2018-11-11 NOTE — PROGRESS NOTES
Pt fed dinner: tolerated mashed potatoes with gravy, pot roast, a cookie, a cup of mandarin oranges and two bites of a salad with no signs of coughing or choking. Pt needed to be fed d/t disability from stroke. Assisted to [facility], stand and pivot, urinated, tolerated well. Returned to chair, alarm on. No further needs at this time.

## 2018-11-12 PROCEDURE — 6370000000 HC RX 637 (ALT 250 FOR IP): Performed by: FAMILY MEDICINE

## 2018-11-12 PROCEDURE — 6370000000 HC RX 637 (ALT 250 FOR IP): Performed by: PHYSICIAN ASSISTANT

## 2018-11-12 PROCEDURE — 1200000000 HC SEMI PRIVATE

## 2018-11-12 PROCEDURE — 6360000002 HC RX W HCPCS: Performed by: FAMILY MEDICINE

## 2018-11-12 PROCEDURE — 2580000003 HC RX 258: Performed by: FAMILY MEDICINE

## 2018-11-12 RX ORDER — TRAMADOL HYDROCHLORIDE 50 MG/1
25 TABLET ORAL EVERY 4 HOURS PRN
Status: DISCONTINUED | OUTPATIENT
Start: 2018-11-12 | End: 2018-11-15 | Stop reason: HOSPADM

## 2018-11-12 RX ADMIN — CARVEDILOL 12.5 MG: 6.25 TABLET, FILM COATED ORAL at 18:46

## 2018-11-12 RX ADMIN — DESMOPRESSIN ACETATE 80 MG: 0.2 TABLET ORAL at 22:59

## 2018-11-12 RX ADMIN — LISINOPRIL 20 MG: 20 TABLET ORAL at 10:03

## 2018-11-12 RX ADMIN — SERTRALINE 50 MG: 50 TABLET, FILM COATED ORAL at 10:03

## 2018-11-12 RX ADMIN — Medication 10 ML: at 10:03

## 2018-11-12 RX ADMIN — CARVEDILOL 12.5 MG: 6.25 TABLET, FILM COATED ORAL at 10:03

## 2018-11-12 RX ADMIN — ENOXAPARIN SODIUM 40 MG: 100 INJECTION SUBCUTANEOUS at 10:03

## 2018-11-12 RX ADMIN — ASPIRIN 81 MG: 81 TABLET, COATED ORAL at 10:03

## 2018-11-12 RX ADMIN — CLOPIDOGREL 75 MG: 75 TABLET, FILM COATED ORAL at 10:03

## 2018-11-12 ASSESSMENT — PAIN SCALES - WONG BAKER: WONGBAKER_NUMERICALRESPONSE: 0

## 2018-11-12 ASSESSMENT — PAIN SCALES - GENERAL: PAINLEVEL_OUTOF10: 0

## 2018-11-12 NOTE — PROGRESS NOTES
Bedside report received from Nic Kramer, [facility]. Pt sitting in recliner with legs elevated, next to  in room 5554. No s/s pain or distress. Call light within reach and bed alarm engaged.   .Electronically signed by Garrtet Wilhelm RN on 11/11/2018 at 7:41 PM

## 2018-11-12 NOTE — PROGRESS NOTES
MRI checklist given to Daughter to complete. Informed that MRI needs checklist before test can be completed.

## 2018-11-12 NOTE — PROGRESS NOTES
Department of Internal Medicine  General Internal Medicine  Progress Note      SUBJECTIVE:   Patient seen this am. Patient feeling ok, no new complaints. Daughter is worried about her worsening mental status, confusion and sleepiness. No new focal deficits. OBJECTIVE      Medications    Current Facility-Administered Medications: traMADol (ULTRAM) tablet 25 mg, 25 mg, Oral, Q4H PRN  lisinopril (PRINIVIL;ZESTRIL) tablet 20 mg, 20 mg, Oral, Daily  acetaminophen (TYLENOL) tablet 650 mg, 650 mg, Oral, Q4H PRN  albuterol (PROVENTIL) nebulizer solution 2.5 mg, 2.5 mg, Nebulization, Q6H PRN  aspirin EC tablet 81 mg, 81 mg, Oral, Daily  atorvastatin (LIPITOR) tablet 80 mg, 80 mg, Oral, Nightly  carvedilol (COREG) tablet 12.5 mg, 12.5 mg, Oral, BID WC  furosemide (LASIX) tablet 20 mg, 20 mg, Oral, Daily PRN  sertraline (ZOLOFT) tablet 50 mg, 50 mg, Oral, Daily  sodium chloride flush 0.9 % injection 10 mL, 10 mL, Intravenous, 2 times per day  sodium chloride flush 0.9 % injection 10 mL, 10 mL, Intravenous, PRN  magnesium hydroxide (MILK OF MAGNESIA) 400 MG/5ML suspension 30 mL, 30 mL, Oral, Daily PRN  ondansetron (ZOFRAN) injection 4 mg, 4 mg, Intravenous, Q6H PRN  enoxaparin (LOVENOX) injection 40 mg, 40 mg, Subcutaneous, Daily  morphine (PF) injection 4 mg, 4 mg, Intravenous, Q4H PRN  clopidogrel (PLAVIX) tablet 75 mg, 75 mg, Oral, Daily  hydrALAZINE (APRESOLINE) injection 5 mg, 5 mg, Intravenous, Q4H PRN  Physical    VITALS:  /63   Pulse 80   Temp 98.1 °F (36.7 °C) (Temporal)   Resp 18   Ht 5' 7\" (1.702 m)   Wt 127 lb 14.4 oz (58 kg)   SpO2 94%   BMI 20.03 kg/m²   General appearance:  Appears comfortable  Eyes: Sclera clear. Pupils equal.  ENT: Moist oral mucosa. Trachea midline, no adenopathy. Cardiovascular: Regular rhythm, normal S1, S2. No murmur. No edema in lower extremities  Respiratory: Not using accessory muscles. Good inspiratory effort. Clear to auscultation bilaterally, no wheeze or crackles. GI: Abdomen soft, no tenderness, not distended, normal bowel sounds  Musculoskeletal: immobilizer on R leg. Neurology: CN 2-12 grossly intact. No speech or motor deficits  Psych: Normal affect. Alert and oriented in time, place and person  Skin: Warm, dry, normal turgor      Data    XR TIBIA FIBULA RIGHT (2 VIEWS)  Final Result  Transverse fracture the proximal tibia with evidence of callus formation,  likely subacute to chronic.        CT Head WO Contrast  Final Result  No acute intracranial abnormality.     Diffuse atrophic changes with findings suggesting chronic microvascular  ischemia and evidence for an old left frontal lobe infarct.        XR CHEST PORTABLE  Final Result  Findings suggest congestive heart failure        ASSESSMENT AND PLAN       1. R Tibial fracture  - Non surgical. Needs therapy, placement.   - Non weightbearing and needs to wear immobilizer. - Needs SNF. Dc when arranged. 2. Hypertension  - Better today  - Lisinopril was increased to 20 mg    3. Bacteruria-cultures negative. abx dc'd     4. Dysphagia-daughter refusing eval and dysphagia diet. 4. H/o CVA-R hemiplegia. 5. afib-on asa. 6. Acute delirium  - MRI ordered per daughter's request     7. G/o diastolic heart failure  - CXR showed pulm congestion  - Lasix as needed           Diet: DIET GENERAL;  Dietary Nutrition Supplements: Standard High Calorie Oral Supplement  Code:Limited  DVT PPX: lovenox.

## 2018-11-13 ENCOUNTER — APPOINTMENT (OUTPATIENT)
Dept: MRI IMAGING | Age: 83
DRG: 563 | End: 2018-11-13
Payer: MEDICARE

## 2018-11-13 PROCEDURE — 99222 1ST HOSP IP/OBS MODERATE 55: CPT | Performed by: INTERNAL MEDICINE

## 2018-11-13 PROCEDURE — 6370000000 HC RX 637 (ALT 250 FOR IP): Performed by: PHYSICIAN ASSISTANT

## 2018-11-13 PROCEDURE — 70553 MRI BRAIN STEM W/O & W/DYE: CPT

## 2018-11-13 PROCEDURE — 1200000000 HC SEMI PRIVATE

## 2018-11-13 PROCEDURE — 97535 SELF CARE MNGMENT TRAINING: CPT

## 2018-11-13 PROCEDURE — 6370000000 HC RX 637 (ALT 250 FOR IP): Performed by: FAMILY MEDICINE

## 2018-11-13 PROCEDURE — A9579 GAD-BASE MR CONTRAST NOS,1ML: HCPCS | Performed by: INTERNAL MEDICINE

## 2018-11-13 PROCEDURE — 97530 THERAPEUTIC ACTIVITIES: CPT

## 2018-11-13 PROCEDURE — 2580000003 HC RX 258: Performed by: FAMILY MEDICINE

## 2018-11-13 PROCEDURE — 2580000003 HC RX 258: Performed by: INTERNAL MEDICINE

## 2018-11-13 PROCEDURE — 6360000004 HC RX CONTRAST MEDICATION: Performed by: INTERNAL MEDICINE

## 2018-11-13 PROCEDURE — 6370000000 HC RX 637 (ALT 250 FOR IP): Performed by: INTERNAL MEDICINE

## 2018-11-13 PROCEDURE — 6360000002 HC RX W HCPCS: Performed by: FAMILY MEDICINE

## 2018-11-13 RX ORDER — CARVEDILOL 25 MG/1
25 TABLET ORAL 2 TIMES DAILY WITH MEALS
Status: DISCONTINUED | OUTPATIENT
Start: 2018-11-13 | End: 2018-11-15 | Stop reason: HOSPADM

## 2018-11-13 RX ORDER — HYDRALAZINE HYDROCHLORIDE 25 MG/1
50 TABLET, FILM COATED ORAL EVERY 8 HOURS SCHEDULED
Status: DISCONTINUED | OUTPATIENT
Start: 2018-11-13 | End: 2018-11-15 | Stop reason: HOSPADM

## 2018-11-13 RX ORDER — SODIUM CHLORIDE 0.9 % (FLUSH) 0.9 %
10 SYRINGE (ML) INJECTION PRN
Status: DISCONTINUED | OUTPATIENT
Start: 2018-11-13 | End: 2018-11-15 | Stop reason: HOSPADM

## 2018-11-13 RX ADMIN — LISINOPRIL 20 MG: 20 TABLET ORAL at 08:09

## 2018-11-13 RX ADMIN — Medication 10 ML: at 23:04

## 2018-11-13 RX ADMIN — ASPIRIN 81 MG: 81 TABLET, COATED ORAL at 08:09

## 2018-11-13 RX ADMIN — ENOXAPARIN SODIUM 40 MG: 100 INJECTION SUBCUTANEOUS at 08:08

## 2018-11-13 RX ADMIN — GADOTERIDOL 11 ML: 279.3 INJECTION, SOLUTION INTRAVENOUS at 12:44

## 2018-11-13 RX ADMIN — CARVEDILOL 12.5 MG: 6.25 TABLET, FILM COATED ORAL at 08:09

## 2018-11-13 RX ADMIN — SERTRALINE 50 MG: 50 TABLET, FILM COATED ORAL at 08:09

## 2018-11-13 RX ADMIN — CLOPIDOGREL 75 MG: 75 TABLET, FILM COATED ORAL at 08:09

## 2018-11-13 RX ADMIN — Medication 10 ML: at 12:44

## 2018-11-13 RX ADMIN — HYDRALAZINE HYDROCHLORIDE 50 MG: 25 TABLET, FILM COATED ORAL at 05:33

## 2018-11-13 RX ADMIN — HYDRALAZINE HYDROCHLORIDE 50 MG: 25 TABLET, FILM COATED ORAL at 23:03

## 2018-11-13 NOTE — PROGRESS NOTES
Functional Mobility Training, Transfer Training, Endurance Training, Gait Training, Safety Education & Training, Modalities, Neuromuscular Re-education, Patient/Caregiver Education & Training, Equipment Evaluation, Education, & procurement  Safety Devices  Type of devices: Call light within reach, Gait belt, Left in bed, Bed alarm in place, Nurse notified  Restraints  Initially in place: No     Therapy Time   Individual Concurrent Group Co-treatment   Time In 1002         Time Out 1030         Minutes 28                  Timed Code Treatment Minutes: 28 minutes    Total Treatment Minutes: 28 Minutes    If patient discharges prior to next treatment, this note will serve as discharge summary.     Carole Pleitez PT, DPT #[id number]

## 2018-11-13 NOTE — CONSULTS
S1S2, no murmur, rub  RESPIRATORY: Normal respiratory effort, diminished to auscultation bibasilar  EXTREMITIES: No cyanosis, clubbing or edema, palpable pulses bilaterally   MUSCULOSKELETAL: No joint swelling or tenderness, no chest wall tenderness  GASTROINTESTINAL:  soft, non-tender, no bruit    Labs:  Lab Review   Lab Results   Component Value Date     2018    K 4.0 2018    K 3.5 2018     2018    CO2 28 2018    BUN 27 2018    CREATININE 1.2 2018    GLUCOSE 100 2018    CALCIUM 9.1 2018     Lab Results   Component Value Date    TROPONINI <0.01 2018     Lab Results   Component Value Date    WBC 7.8 2018    HGB 11.2 2018    HCT 34.8 2018    MCV 90.8 2018     2018     Lab Results   Component Value Date    CHOL 160 10/16/2016    TRIG 94 10/16/2016    HDL 55 10/16/2016         Imaging:  I have reviewed the below testing personally:    EK/4/18  Atrial fibrillation Nonspecific ST and T wave abnormality     ECHO:   18  Summary     Normal left ventricle size and systolic function with an EF 60%.     Moderate concentric left ventricular hypertrophy.     Diastolic filling parameters suggests diastolic function.     Moderate mitral regurgitation     The left atrium is dilated.     Moderate tricuspid regurgitation. RVSP 44 mmHg.     The right atrium is mildly dilated.     Mild pulmonic regurgitation. Impression/Recommendations    Ms. Ramana Das is a 80 y.o. female patient    Hypertension, with improving control  Stable CAD  Chronic Diastolic HF (LVEF 00% with moderate MR, moderate TR, mild PH) , ACC/AHA Stage C  Chronic atrial fibrillation, rate controlled, [id number]= 8   Dysphagia, with recurrent aspiration pneumonia   Dyslipidemia         Aleks Wen is asymptomatic currently, well compensated and remains on:    ASA 81 mg  Clopidogrel 75 mg    Carvedilol 25 mg bid  Hydralazine 50 mg q8  Lisinopril 20 mg daily  Lasix 20 mg PRN      Given her age and weighing adverse effect/benefit, I will discontinue her Atorvastatin 80 mg. Otherwise, the antihypertensives have been adjusted appropriately for goal of 130-140/80-90 and to avoid daily diuretics due to previous issues with dehydration. I am awaiting Yudelka's meeting with [facility] [address] [place] [address],[address] tomorrow so that Juan Gannon may potentially be able to go to the same facility as her  [facility]. Thank you for allowing me to participate in the care of your patient. Please do not hesitate to call. Shanon Willard D.O., [facility]  Interventional Cardiology     o: [phone]  [address] [place] [address]., [address], [address]        NOTE:  This report was transcribed using voice recognition software. Every effort was made to ensure accuracy; however, inadvertent computerized transcription errors may be present.

## 2018-11-13 NOTE — PROGRESS NOTES
Pt VS taken and are WNL except for elevated BP of 161/84. Pt sleeping comfortably with no s/s pain or distress. Pt BP rechecked 20 minutes later, BP down to 147/69, HR 66; continuing to monitor. No apparent needs at this time. Pt returning back to sleep. Call light within reach and bed alarm engaged.   .Electronically signed by Miri Thomas RN on 11/13/2018 at 1:05 AM

## 2018-11-13 NOTE — PROGRESS NOTES
Department of Internal Medicine  General Internal Medicine  Progress Note      SUBJECTIVE:   Patient seen this am. Patient feeling ok, no new complaints. Daughter is worried about her worsening mental status, confusion and sleepiness. No new focal deficits. OBJECTIVE      Medications    Current Facility-Administered Medications: hydrALAZINE (APRESOLINE) tablet 50 mg, 50 mg, Oral, 3 times per day  sodium chloride flush 0.9 % injection 10 mL, 10 mL, Intravenous, PRN  carvedilol (COREG) tablet 25 mg, 25 mg, Oral, BID WC  traMADol (ULTRAM) tablet 25 mg, 25 mg, Oral, Q4H PRN  lisinopril (PRINIVIL;ZESTRIL) tablet 20 mg, 20 mg, Oral, Daily  acetaminophen (TYLENOL) tablet 650 mg, 650 mg, Oral, Q4H PRN  albuterol (PROVENTIL) nebulizer solution 2.5 mg, 2.5 mg, Nebulization, Q6H PRN  aspirin EC tablet 81 mg, 81 mg, Oral, Daily  atorvastatin (LIPITOR) tablet 80 mg, 80 mg, Oral, Nightly  furosemide (LASIX) tablet 20 mg, 20 mg, Oral, Daily PRN  sertraline (ZOLOFT) tablet 50 mg, 50 mg, Oral, Daily  sodium chloride flush 0.9 % injection 10 mL, 10 mL, Intravenous, 2 times per day  sodium chloride flush 0.9 % injection 10 mL, 10 mL, Intravenous, PRN  magnesium hydroxide (MILK OF MAGNESIA) 400 MG/5ML suspension 30 mL, 30 mL, Oral, Daily PRN  ondansetron (ZOFRAN) injection 4 mg, 4 mg, Intravenous, Q6H PRN  enoxaparin (LOVENOX) injection 40 mg, 40 mg, Subcutaneous, Daily  morphine (PF) injection 4 mg, 4 mg, Intravenous, Q4H PRN  clopidogrel (PLAVIX) tablet 75 mg, 75 mg, Oral, Daily  hydrALAZINE (APRESOLINE) injection 5 mg, 5 mg, Intravenous, Q4H PRN  Physical    VITALS:  BP (!) 165/81   Pulse 78   Temp 97.9 °F (36.6 °C)   Resp 16   Ht 5' 7\" (1.702 m)   Wt 129 lb 11.2 oz (58.8 kg)   SpO2 93%   BMI 20.31 kg/m²   General appearance:  Appears comfortable  Eyes: Sclera clear. Pupils equal.  ENT: Moist oral mucosa. Trachea midline, no adenopathy. Cardiovascular: Regular rhythm, normal S1, S2. No murmur.  No edema in lower

## 2018-11-14 PROBLEM — F32.A DEPRESSION: Status: ACTIVE | Noted: 2017-07-20

## 2018-11-14 PROCEDURE — 97535 SELF CARE MNGMENT TRAINING: CPT

## 2018-11-14 PROCEDURE — 1200000000 HC SEMI PRIVATE

## 2018-11-14 PROCEDURE — 97530 THERAPEUTIC ACTIVITIES: CPT

## 2018-11-14 PROCEDURE — 6370000000 HC RX 637 (ALT 250 FOR IP): Performed by: PHYSICIAN ASSISTANT

## 2018-11-14 PROCEDURE — 99222 1ST HOSP IP/OBS MODERATE 55: CPT | Performed by: PSYCHIATRY & NEUROLOGY

## 2018-11-14 PROCEDURE — 6360000002 HC RX W HCPCS: Performed by: FAMILY MEDICINE

## 2018-11-14 PROCEDURE — 6370000000 HC RX 637 (ALT 250 FOR IP): Performed by: INTERNAL MEDICINE

## 2018-11-14 PROCEDURE — 6370000000 HC RX 637 (ALT 250 FOR IP): Performed by: FAMILY MEDICINE

## 2018-11-14 PROCEDURE — 2580000003 HC RX 258: Performed by: FAMILY MEDICINE

## 2018-11-14 RX ORDER — ESCITALOPRAM OXALATE 10 MG/1
5 TABLET ORAL DAILY
Status: DISCONTINUED | OUTPATIENT
Start: 2018-11-15 | End: 2018-11-15 | Stop reason: HOSPADM

## 2018-11-14 RX ORDER — ACETAMINOPHEN 80 MG
TABLET,CHEWABLE ORAL
Status: COMPLETED
Start: 2018-11-14 | End: 2018-11-14

## 2018-11-14 RX ADMIN — Medication: at 05:08

## 2018-11-14 RX ADMIN — LISINOPRIL 20 MG: 20 TABLET ORAL at 10:45

## 2018-11-14 RX ADMIN — ENOXAPARIN SODIUM 40 MG: 100 INJECTION SUBCUTANEOUS at 10:45

## 2018-11-14 RX ADMIN — CLOPIDOGREL 75 MG: 75 TABLET, FILM COATED ORAL at 10:45

## 2018-11-14 RX ADMIN — Medication 10 ML: at 10:46

## 2018-11-14 RX ADMIN — ASPIRIN 81 MG: 81 TABLET, COATED ORAL at 10:45

## 2018-11-14 RX ADMIN — SERTRALINE 50 MG: 50 TABLET, FILM COATED ORAL at 10:45

## 2018-11-14 RX ADMIN — CARVEDILOL 25 MG: 25 TABLET, FILM COATED ORAL at 10:45

## 2018-11-14 RX ADMIN — TRAMADOL HYDROCHLORIDE 25 MG: 50 TABLET, FILM COATED ORAL at 02:53

## 2018-11-14 RX ADMIN — CARVEDILOL 25 MG: 25 TABLET, FILM COATED ORAL at 18:31

## 2018-11-14 ASSESSMENT — PAIN SCALES - GENERAL
PAINLEVEL_OUTOF10: 4
PAINLEVEL_OUTOF10: 3

## 2018-11-14 ASSESSMENT — PAIN DESCRIPTION - LOCATION: LOCATION: LEG

## 2018-11-14 ASSESSMENT — PAIN DESCRIPTION - PAIN TYPE: TYPE: ACUTE PAIN

## 2018-11-14 ASSESSMENT — PAIN DESCRIPTION - ORIENTATION: ORIENTATION: RIGHT

## 2018-11-14 NOTE — PROGRESS NOTES
Physical Therapy  Facility/Department: [address] ONCOLOGY  Daily Treatment Note  NAME: Dyan Urena  : 10/14/1932  MRN: [id number]    Date of Service: 2018    Discharge Recommendations:Barbara L Spurling scored a 9/24 on the AM-PAC short mobility form. Current research shows that an AM-PAC score of 17 or less is typically not associated with a discharge to the patient's home setting. Based on the patients AM-PAC score and their current functional mobility deficits, it is recommended that the patient have 3-5 sessions per week of Physical Therapy at d/c to increase the patients independence. PT Equipment Recommendations  Equipment Needed: No  Other: defer to next level of care    Patient Diagnosis(es): The primary encounter diagnosis was Acute confusion. Diagnoses of Foul smelling urine and Tibia fracture, right, closed, initial encounter were also pertinent to this visit. has a past medical history of CAD (coronary artery disease); CVA (cerebral vascular accident) ([facility] [address] and Hypertension. has a past surgical history that includes Coronary angioplasty with stent () and Cosmetic surgery. Restrictions  Restrictions/Precautions  Restrictions/Precautions: Weight Bearing, Fall Risk (high fall risk)  Required Braces or Orthoses?: Yes  Lower Extremity Weight Bearing Restrictions  Right Lower Extremity Weight Bearing: Non Weight Bearing  Required Braces or Orthoses  Right Lower Extremity Brace: Knee Immobilizer  Position Activity Restriction  Other position/activity restrictions: : Dyan Urena is a 80 y.o. female who presents emergency Department with concern for acute confusion. The patient does have baseline history of dementia as well as right-sided weakness after CVA. Her  is critically ill in the hospital now. According to her daughter, the patient was belligerent last night and screaming that her  was having an affair.   The nursing staff was concerned that Evaluation, Education, & procurement  Safety Devices  Type of devices: Call light within reach, Gait belt, Left in bed, Bed alarm in place, Nurse notified  Restraints  Initially in place: No     Therapy Time   Individual Concurrent Group Co-treatment   Time In       [phone]   Time Out       1422   Minutes       30   Timed Code Treatment Minutes: Jeramy Chavez, PT   Rupali Ewing, PT, DPT, [id number]
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
## 2018-11-14 NOTE — PROGRESS NOTES
Department of Internal Medicine  General Internal Medicine  Progress Note      SUBJECTIVE:   Patient seen this am. Patient feeling ok, no new complaints. Bp better controlled. No new focal deficits. OBJECTIVE      Medications    Current Facility-Administered Medications: hydrALAZINE (APRESOLINE) tablet 50 mg, 50 mg, Oral, 3 times per day  sodium chloride flush 0.9 % injection 10 mL, 10 mL, Intravenous, PRN  carvedilol (COREG) tablet 25 mg, 25 mg, Oral, BID WC  traMADol (ULTRAM) tablet 25 mg, 25 mg, Oral, Q4H PRN  lisinopril (PRINIVIL;ZESTRIL) tablet 20 mg, 20 mg, Oral, Daily  acetaminophen (TYLENOL) tablet 650 mg, 650 mg, Oral, Q4H PRN  albuterol (PROVENTIL) nebulizer solution 2.5 mg, 2.5 mg, Nebulization, Q6H PRN  aspirin EC tablet 81 mg, 81 mg, Oral, Daily  furosemide (LASIX) tablet 20 mg, 20 mg, Oral, Daily PRN  sertraline (ZOLOFT) tablet 50 mg, 50 mg, Oral, Daily  sodium chloride flush 0.9 % injection 10 mL, 10 mL, Intravenous, 2 times per day  sodium chloride flush 0.9 % injection 10 mL, 10 mL, Intravenous, PRN  magnesium hydroxide (MILK OF MAGNESIA) 400 MG/5ML suspension 30 mL, 30 mL, Oral, Daily PRN  ondansetron (ZOFRAN) injection 4 mg, 4 mg, Intravenous, Q6H PRN  enoxaparin (LOVENOX) injection 40 mg, 40 mg, Subcutaneous, Daily  morphine (PF) injection 4 mg, 4 mg, Intravenous, Q4H PRN  clopidogrel (PLAVIX) tablet 75 mg, 75 mg, Oral, Daily  hydrALAZINE (APRESOLINE) injection 5 mg, 5 mg, Intravenous, Q4H PRN  Physical    VITALS:  BP (!) 142/75   Pulse 81   Temp 98.2 °F (36.8 °C) (Oral)   Resp 16   Ht 5' 7\" (1.702 m)   Wt 129 lb 6.4 oz (58.7 kg)   SpO2 92%   BMI 20.27 kg/m²   General appearance:  Appears comfortable  Eyes: Sclera clear. Pupils equal.  ENT: Moist oral mucosa. Trachea midline, no adenopathy. Cardiovascular: Regular rhythm, normal S1, S2. No murmur. No edema in lower extremities  Respiratory: Not using accessory muscles. Good inspiratory effort.  Clear to auscultation bilaterally, no

## 2018-11-14 NOTE — PROGRESS NOTES
Pt assessment completed. Pt VS taken and are WNL. Pt denies pain. Pt requested bedpan and pt placed. Pt voided with small BM. Pt positioned for comfort. Pt Lipitor discontinued. Pt given scheduled hydralazine and IV flushed. Pt called using call light and asked to use bedpan. Pt using call light appropriately. Pt given fresh ice water and assisted to drink. Pt wanted TV on it was setup for her. No further needs. Call light within reach and bed alarm engaged.   .Electronically signed by Radha Null RN on 11/13/2018 at 11:16 PM

## 2018-11-14 NOTE — PROGRESS NOTES
Pt has pulled off all of the leads to her Tele box. Pt not cooperative with care to allow nurse to place back on.   .Electronically signed by Maxim Gonzales RN on 11/14/2018 at 8:22 AM

## 2018-11-15 VITALS
BODY MASS INDEX: 20.29 KG/M2 | WEIGHT: 129.3 LBS | HEIGHT: 67 IN | HEART RATE: 79 BPM | RESPIRATION RATE: 17 BRPM | OXYGEN SATURATION: 94 % | SYSTOLIC BLOOD PRESSURE: 114 MMHG | DIASTOLIC BLOOD PRESSURE: 59 MMHG | TEMPERATURE: 97.8 F

## 2018-11-15 PROCEDURE — 6370000000 HC RX 637 (ALT 250 FOR IP): Performed by: PSYCHIATRY & NEUROLOGY

## 2018-11-15 PROCEDURE — 6370000000 HC RX 637 (ALT 250 FOR IP): Performed by: PHYSICIAN ASSISTANT

## 2018-11-15 PROCEDURE — 2580000003 HC RX 258: Performed by: FAMILY MEDICINE

## 2018-11-15 PROCEDURE — 6370000000 HC RX 637 (ALT 250 FOR IP): Performed by: INTERNAL MEDICINE

## 2018-11-15 PROCEDURE — 6370000000 HC RX 637 (ALT 250 FOR IP): Performed by: FAMILY MEDICINE

## 2018-11-15 PROCEDURE — 6360000002 HC RX W HCPCS: Performed by: FAMILY MEDICINE

## 2018-11-15 RX ORDER — ACETAMINOPHEN 325 MG/1
650 TABLET ORAL EVERY 4 HOURS PRN
Qty: 120 TABLET | Refills: 3 | Status: SHIPPED | OUTPATIENT
Start: 2018-11-15 | End: 2019-01-09

## 2018-11-15 RX ORDER — HYDRALAZINE HYDROCHLORIDE 50 MG/1
50 TABLET, FILM COATED ORAL EVERY 8 HOURS SCHEDULED
Qty: 90 TABLET | Refills: 3 | Status: SHIPPED | OUTPATIENT
Start: 2018-11-15 | End: 2019-01-09 | Stop reason: SDUPTHER

## 2018-11-15 RX ORDER — ESCITALOPRAM OXALATE 5 MG/1
5 TABLET ORAL DAILY
Qty: 30 TABLET | Refills: 3 | Status: SHIPPED | OUTPATIENT
Start: 2018-11-16 | End: 2019-01-09 | Stop reason: SDUPTHER

## 2018-11-15 RX ORDER — CARVEDILOL 25 MG/1
25 TABLET ORAL 2 TIMES DAILY WITH MEALS
Qty: 60 TABLET | Refills: 3 | Status: SHIPPED | OUTPATIENT
Start: 2018-11-15 | End: 2019-01-14 | Stop reason: SDUPTHER

## 2018-11-15 RX ORDER — LISINOPRIL 20 MG/1
20 TABLET ORAL DAILY
Qty: 30 TABLET | Refills: 3 | Status: SHIPPED | OUTPATIENT
Start: 2018-11-16 | End: 2019-02-01 | Stop reason: SDUPTHER

## 2018-11-15 RX ADMIN — Medication 10 ML: at 09:32

## 2018-11-15 RX ADMIN — ESCITALOPRAM OXALATE 5 MG: 10 TABLET ORAL at 09:32

## 2018-11-15 RX ADMIN — CARVEDILOL 25 MG: 25 TABLET, FILM COATED ORAL at 18:08

## 2018-11-15 RX ADMIN — ASPIRIN 81 MG: 81 TABLET, COATED ORAL at 09:32

## 2018-11-15 RX ADMIN — CLOPIDOGREL 75 MG: 75 TABLET, FILM COATED ORAL at 09:31

## 2018-11-15 RX ADMIN — HYDRALAZINE HYDROCHLORIDE 50 MG: 25 TABLET, FILM COATED ORAL at 06:09

## 2018-11-15 RX ADMIN — LISINOPRIL 20 MG: 20 TABLET ORAL at 09:32

## 2018-11-15 RX ADMIN — CARVEDILOL 25 MG: 25 TABLET, FILM COATED ORAL at 09:31

## 2018-11-15 RX ADMIN — ENOXAPARIN SODIUM 40 MG: 100 INJECTION SUBCUTANEOUS at 09:48

## 2018-11-15 ASSESSMENT — PAIN SCALES - GENERAL
PAINLEVEL_OUTOF10: 0

## 2018-11-15 NOTE — PROGRESS NOTES
Assessment complete. See flow sheet. Patient resting in bed quietly at this time. No complaints of pain or discomfort voiced at this time. Tele monitor placed on patient at this time. Incontinent of urine at this time and [place] care provided. Patient turned and repositioned for comfort. Camera remains in room. Daughter in to visit with patient. Respirations easy and even. Denies needs at this time. The care plan and education has been reviewed and mutually agreed upon with the patient. Will monitor.

## 2018-11-15 NOTE — PROGRESS NOTES
Report called to [facility]. Electronically signed by Lauren Platt RN on 11/15/2018 at 4:53 PM

## 2018-11-15 NOTE — PROGRESS NOTES
no wheeze or crackles. GI: Abdomen soft, no tenderness, not distended, normal bowel sounds  Musculoskeletal: immobilizer on R leg. Neurology: CN 2-12 grossly intact. No speech or motor deficits  Psych: Normal affect. Alert and oriented in time, place and person  Skin: Warm, dry, normal turgor      Data    XR TIBIA FIBULA RIGHT (2 VIEWS)  Final Result  Transverse fracture the proximal tibia with evidence of callus formation,  likely subacute to chronic.        CT Head WO Contrast  Final Result  No acute intracranial abnormality.     Diffuse atrophic changes with findings suggesting chronic microvascular  ischemia and evidence for an old left frontal lobe infarct.        XR CHEST PORTABLE  Final Result  Findings suggest congestive heart failure      ASSESSMENT AND PLAN       1. R Tibial fracture  - Non surgical. Needs therapy, placement.   - Non weightbearing and needs to wear immobilizer. - Needs SNF. Dc when arranged. 2. Hypertension  - Better controlled   - Cont Lisinopril 20 mg  - Hydralazine and coreg dose was increased during this admission   - Seen by cards     3. Bacteruria-cultures negative. abx dc'd     4. Dysphagia-tolerating diet. 4. H/o CVA-R hemiplegia. 5. Afib-on asa. 6. Acute delirium  - MRI ordered per daughter's request. Came back unremarkable   - Psych consulted   - Started on lexapro 5 mg daily     7. G/o diastolic heart failure  - CXR showed pulm congestion  - Lasix as needed         Diet: DIET GENERAL   Dietary Nutrition Supplements: Standard High Calorie Oral Supplement  Code:Limited  DVT PPX: lovenox. Dispo: JANIE is working on placement.

## 2018-11-15 NOTE — PLAN OF CARE
Problem: Falls - Risk of:  Goal: Absence of physical injury  Absence of physical injury  Outcome: Ongoing
Problem: Falls - Risk of:  Goal: Will remain free from falls  Will remain free from falls   Outcome: Ongoing
Problem: Falls - Risk of:  Goal: Will remain free from falls  Will remain free from falls   Outcome: Ongoing    Goal: Absence of physical injury  Absence of physical injury   Outcome: Ongoing      Problem: Skin Integrity:  Goal: Absence of new skin breakdown  Absence of new skin breakdown   Outcome: Ongoing
Problem: Falls - Risk of:  Goal: Will remain free from falls  Will remain free from falls   Outcome: Ongoing  Fall precautions in place, bed alarm on, call light in reach. Problem: Risk for Impaired Skin Integrity  Goal: Tissue integrity - skin and mucous membranes  Structural intactness and normal physiological function of skin and  mucous membranes. Outcome: Ongoing  Turning & repositioning q2, heels elevated on pillow, preventative mepilex applied to coccyx. Problem: Pain:  Goal: Pain level will decrease  Pain level will decrease   Outcome: Ongoing    Goal: Control of acute pain  Control of acute pain   Outcome: Ongoing  Morphine given for RLE pain.
Problem: Falls - Risk of:  Goal: Will remain free from falls  Will remain free from falls   Outcome: Ongoing  Fall precautions remain in place.  [address]
Problem: Falls - Risk of:  Goal: Will remain free from falls  Will remain free from falls   Outcome: Ongoing  High fall risk. Patient remains free from falls. Patient encouraged to use call light with any needs. Patient states understanding, call light in reach, chair alarm on. Problem: Skin Integrity:  Goal: Absence of new skin breakdown  Absence of new skin breakdown   Outcome: Ongoing  Ongoing skin assessments. Coccyx red but blanchable. Mepilex border in place. Skin assessed this shift. Turn every 2 hours. Sitting on chair cushion.
Problem: Nutrition  Goal: Optimal nutrition therapy  Outcome: Ongoing  Nutrition Problem: Inadequate energy intake  Intervention: Food and/or Nutrient Delivery: Continue current diet, Continue current ONS  Nutritional Goals: Pt will consume at least 50% of meals and supplements
Problem: Pain:  Goal: Pain level will decrease  Pain level will decrease   Outcome: Ongoing
transfer  . Burnice Sinning Intervention: Assess proper use of assistive devices  . Intervention: Balance assessment  . Intervention: Balance assessment  . Intervention: Activity progression plan  . Goal: Mobility will improve  Mobility will improve  Outcome: Ongoing  . Problem: Infection - Surgical Site:  Intervention: Infection risk assessment  . Intervention: Promote nutritional intake  . Goal: Will show no infection signs and symptoms  Will show no infection signs and symptoms  Outcome: Ongoing  . Problem: OXYGENATION/RESPIRATORY FUNCTION  Goal: Patient will maintain patent airway  Outcome: Ongoing  . Intervention: POSITION PATIENT FOR MAXIMUM VENTILATORY EFFICIENCY  . Intervention: ASSESS BREATH SOUNDS IN ALL LOBES  . Intervention: PROVIDE ADEQUATE FLUID INTAKE TO LIQUIFY SECRETIONS  . Intervention: MONITOR INTAKE AND OUTPUT  . Intervention: ENCOURAGE INCENTIVE SPIROMETRY  . Problem: Serum Glucose Level - Abnormal:  Intervention: Monitor blood glucose measurement  . Intervention: Manage insulin injections  . Goal: Ability to maintain appropriate glucose levels will improve  Ability to maintain appropriate glucose levels will improve  Outcome: Ongoing  .

## 2018-11-16 LAB
EKG ATRIAL RATE: 117 BPM
EKG DIAGNOSIS: NORMAL
EKG Q-T INTERVAL: 370 MS
EKG QRS DURATION: 90 MS
EKG QTC CALCULATION (BAZETT): 442 MS
EKG R AXIS: 34 DEGREES
EKG T AXIS: 249 DEGREES
EKG VENTRICULAR RATE: 86 BPM

## 2019-01-09 ENCOUNTER — OFFICE VISIT (OUTPATIENT)
Dept: INTERNAL MEDICINE CLINIC | Age: 84
End: 2019-01-09
Payer: MEDICARE

## 2019-01-09 ENCOUNTER — TELEPHONE (OUTPATIENT)
Dept: INTERNAL MEDICINE CLINIC | Age: 84
End: 2019-01-09

## 2019-01-09 VITALS
WEIGHT: 121 LBS | DIASTOLIC BLOOD PRESSURE: 80 MMHG | BODY MASS INDEX: 18.95 KG/M2 | HEART RATE: 72 BPM | SYSTOLIC BLOOD PRESSURE: 142 MMHG

## 2019-01-09 DIAGNOSIS — H91.93 HEARING PROBLEM OF BOTH EARS: ICD-10-CM

## 2019-01-09 DIAGNOSIS — I63.232 ARTERIAL ISCHEMIC STROKE, ICA, LEFT, ACUTE (HCC): ICD-10-CM

## 2019-01-09 DIAGNOSIS — I63.9 CEREBROVASCULAR ACCIDENT (CVA), UNSPECIFIED MECHANISM (HCC): Primary | ICD-10-CM

## 2019-01-09 DIAGNOSIS — R53.1 WEAKNESS: ICD-10-CM

## 2019-01-09 DIAGNOSIS — N39.0 URINARY TRACT INFECTION WITHOUT HEMATURIA, SITE UNSPECIFIED: ICD-10-CM

## 2019-01-09 DIAGNOSIS — I10 ESSENTIAL HYPERTENSION: ICD-10-CM

## 2019-01-09 DIAGNOSIS — F32.A DEPRESSION, UNSPECIFIED DEPRESSION TYPE: ICD-10-CM

## 2019-01-09 DIAGNOSIS — I25.10 CORONARY ARTERY DISEASE DUE TO LIPID RICH PLAQUE: ICD-10-CM

## 2019-01-09 DIAGNOSIS — K59.00 CONSTIPATION, UNSPECIFIED CONSTIPATION TYPE: ICD-10-CM

## 2019-01-09 DIAGNOSIS — I50.32 CHRONIC DIASTOLIC HEART FAILURE (HCC): ICD-10-CM

## 2019-01-09 DIAGNOSIS — I25.83 CORONARY ARTERY DISEASE DUE TO LIPID RICH PLAQUE: ICD-10-CM

## 2019-01-09 LAB
ANION GAP SERPL CALCULATED.3IONS-SCNC: 10 MMOL/L (ref 3–16)
BASOPHILS ABSOLUTE: 0.1 K/UL (ref 0–0.2)
BASOPHILS RELATIVE PERCENT: 0.8 %
BILIRUBIN URINE: NEGATIVE
BLOOD, URINE: NEGATIVE
BUN BLDV-MCNC: 23 MG/DL (ref 7–20)
CALCIUM SERPL-MCNC: 10.6 MG/DL (ref 8.3–10.6)
CHLORIDE BLD-SCNC: 105 MMOL/L (ref 99–110)
CLARITY: ABNORMAL
CO2: 28 MMOL/L (ref 21–32)
COLOR: YELLOW
CREAT SERPL-MCNC: 0.8 MG/DL (ref 0.6–1.2)
CRYSTALS, UA: ABNORMAL /HPF
EOSINOPHILS ABSOLUTE: 0.2 K/UL (ref 0–0.6)
EOSINOPHILS RELATIVE PERCENT: 3.2 %
EPITHELIAL CELLS, UA: 9 /HPF (ref 0–5)
GFR AFRICAN AMERICAN: >60
GFR NON-AFRICAN AMERICAN: >60
GLUCOSE BLD-MCNC: 102 MG/DL (ref 70–99)
GLUCOSE URINE: NEGATIVE MG/DL
HCT VFR BLD CALC: 38 % (ref 36–48)
HEMOGLOBIN: 12.3 G/DL (ref 12–16)
HYALINE CASTS: 2 /LPF (ref 0–8)
KETONES, URINE: NEGATIVE MG/DL
LEUKOCYTE ESTERASE, URINE: ABNORMAL
LYMPHOCYTES ABSOLUTE: 2 K/UL (ref 1–5.1)
LYMPHOCYTES RELATIVE PERCENT: 27 %
MCH RBC QN AUTO: 28.6 PG (ref 26–34)
MCHC RBC AUTO-ENTMCNC: 32.4 G/DL (ref 31–36)
MCV RBC AUTO: 88.4 FL (ref 80–100)
MICROSCOPIC EXAMINATION: YES
MONOCYTES ABSOLUTE: 0.5 K/UL (ref 0–1.3)
MONOCYTES RELATIVE PERCENT: 6.7 %
NEUTROPHILS ABSOLUTE: 4.6 K/UL (ref 1.7–7.7)
NEUTROPHILS RELATIVE PERCENT: 62.3 %
NITRITE, URINE: NEGATIVE
PDW BLD-RTO: 16.7 % (ref 12.4–15.4)
PH UA: 6
PLATELET # BLD: 280 K/UL (ref 135–450)
PMV BLD AUTO: 8.6 FL (ref 5–10.5)
POTASSIUM SERPL-SCNC: 4.5 MMOL/L (ref 3.5–5.1)
PROTEIN UA: NEGATIVE MG/DL
RBC # BLD: 4.3 M/UL (ref 4–5.2)
RBC UA: 1 /HPF (ref 0–4)
SODIUM BLD-SCNC: 143 MMOL/L (ref 136–145)
SPECIFIC GRAVITY UA: 1.02
TSH REFLEX: 2.18 UIU/ML (ref 0.27–4.2)
URINE REFLEX TO CULTURE: YES
URINE TYPE: ABNORMAL
UROBILINOGEN, URINE: 1 E.U./DL
WBC # BLD: 7.3 K/UL (ref 4–11)
WBC UA: 12 /HPF (ref 0–5)
YEAST: PRESENT /HPF

## 2019-01-09 PROCEDURE — G8484 FLU IMMUNIZE NO ADMIN: HCPCS | Performed by: INTERNAL MEDICINE

## 2019-01-09 PROCEDURE — 1090F PRES/ABSN URINE INCON ASSESS: CPT | Performed by: INTERNAL MEDICINE

## 2019-01-09 PROCEDURE — G8598 ASA/ANTIPLAT THER USED: HCPCS | Performed by: INTERNAL MEDICINE

## 2019-01-09 PROCEDURE — G8420 CALC BMI NORM PARAMETERS: HCPCS | Performed by: INTERNAL MEDICINE

## 2019-01-09 PROCEDURE — G8427 DOCREV CUR MEDS BY ELIG CLIN: HCPCS | Performed by: INTERNAL MEDICINE

## 2019-01-09 PROCEDURE — 4040F PNEUMOC VAC/ADMIN/RCVD: CPT | Performed by: INTERNAL MEDICINE

## 2019-01-09 PROCEDURE — 1036F TOBACCO NON-USER: CPT | Performed by: INTERNAL MEDICINE

## 2019-01-09 PROCEDURE — 1123F ACP DISCUSS/DSCN MKR DOCD: CPT | Performed by: INTERNAL MEDICINE

## 2019-01-09 PROCEDURE — 1101F PT FALLS ASSESS-DOCD LE1/YR: CPT | Performed by: INTERNAL MEDICINE

## 2019-01-09 PROCEDURE — 99204 OFFICE O/P NEW MOD 45 MIN: CPT | Performed by: INTERNAL MEDICINE

## 2019-01-09 RX ORDER — ESCITALOPRAM OXALATE 5 MG/1
5 TABLET ORAL DAILY
Qty: 30 TABLET | Refills: 2 | Status: SHIPPED | OUTPATIENT
Start: 2019-01-09 | End: 2019-03-29 | Stop reason: SDUPTHER

## 2019-01-09 RX ORDER — WHEAT DEXTRIN 3 G/3.8 G
4 POWDER (GRAM) ORAL
Qty: 475 G | Refills: 1 | Status: SHIPPED | OUTPATIENT
Start: 2019-01-09 | End: 2019-02-01

## 2019-01-09 RX ORDER — HYDRALAZINE HYDROCHLORIDE 50 MG/1
50 TABLET, FILM COATED ORAL EVERY 8 HOURS SCHEDULED
Qty: 90 TABLET | Refills: 3 | Status: SHIPPED | OUTPATIENT
Start: 2019-01-09 | End: 2019-01-09 | Stop reason: SDUPTHER

## 2019-01-09 RX ORDER — ESCITALOPRAM OXALATE 5 MG/1
5 TABLET ORAL DAILY
Qty: 30 TABLET | Refills: 2 | Status: SHIPPED | OUTPATIENT
Start: 2019-01-09 | End: 2019-01-09 | Stop reason: SDUPTHER

## 2019-01-09 RX ORDER — HYDRALAZINE HYDROCHLORIDE 50 MG/1
50 TABLET, FILM COATED ORAL EVERY 8 HOURS SCHEDULED
Qty: 90 TABLET | Refills: 2 | Status: SHIPPED | OUTPATIENT
Start: 2019-01-09 | End: 2019-03-29

## 2019-01-09 ASSESSMENT — ENCOUNTER SYMPTOMS
NAUSEA: 0
VOICE CHANGE: 0
COLOR CHANGE: 0
VOMITING: 0
SHORTNESS OF BREATH: 0
CONSTIPATION: 1
ABDOMINAL PAIN: 0
TROUBLE SWALLOWING: 0
WHEEZING: 0

## 2019-01-10 DIAGNOSIS — B37.31 VAGINAL CANDIDIASIS: ICD-10-CM

## 2019-01-10 DIAGNOSIS — R82.998 CALCIUM OXALATE CRYSTALS IN URINE: ICD-10-CM

## 2019-01-10 DIAGNOSIS — N39.0 URINARY TRACT INFECTION WITHOUT HEMATURIA, SITE UNSPECIFIED: Primary | ICD-10-CM

## 2019-01-10 RX ORDER — FLUCONAZOLE 150 MG/1
150 TABLET ORAL ONCE
Qty: 1 TABLET | Refills: 1 | Status: SHIPPED | OUTPATIENT
Start: 2019-01-10 | End: 2019-01-10

## 2019-01-10 RX ORDER — CEPHALEXIN 500 MG/1
500 CAPSULE ORAL 3 TIMES DAILY
Qty: 21 CAPSULE | Refills: 0 | Status: SHIPPED | OUTPATIENT
Start: 2019-01-10 | End: 2019-03-29 | Stop reason: ALTCHOICE

## 2019-01-11 ENCOUNTER — TELEPHONE (OUTPATIENT)
Dept: CARDIOLOGY CLINIC | Age: 84
End: 2019-01-11

## 2019-01-11 DIAGNOSIS — I50.32 CHRONIC DIASTOLIC HEART FAILURE (HCC): ICD-10-CM

## 2019-01-11 DIAGNOSIS — I25.10 CORONARY ARTERY DISEASE DUE TO LIPID RICH PLAQUE: Primary | ICD-10-CM

## 2019-01-11 DIAGNOSIS — I25.83 CORONARY ARTERY DISEASE DUE TO LIPID RICH PLAQUE: Primary | ICD-10-CM

## 2019-01-12 LAB — URINE CULTURE, ROUTINE: NORMAL

## 2019-01-14 ENCOUNTER — TELEPHONE (OUTPATIENT)
Dept: CARDIOLOGY CLINIC | Age: 84
End: 2019-01-14

## 2019-01-14 DIAGNOSIS — I25.10 CORONARY ARTERY DISEASE DUE TO LIPID RICH PLAQUE: Primary | ICD-10-CM

## 2019-01-14 DIAGNOSIS — I25.83 CORONARY ARTERY DISEASE DUE TO LIPID RICH PLAQUE: Primary | ICD-10-CM

## 2019-01-14 RX ORDER — CLOPIDOGREL BISULFATE 75 MG/1
75 TABLET ORAL DAILY
Qty: 90 TABLET | Refills: 3 | Status: SHIPPED | OUTPATIENT
Start: 2019-01-14 | End: 2020-01-30

## 2019-01-14 RX ORDER — CARVEDILOL 12.5 MG/1
12.5 TABLET ORAL 2 TIMES DAILY WITH MEALS
Qty: 90 TABLET | Refills: 3 | Status: SHIPPED | OUTPATIENT
Start: 2019-01-14 | End: 2019-03-29

## 2019-01-14 RX ORDER — CARVEDILOL 25 MG/1
25 TABLET ORAL 2 TIMES DAILY WITH MEALS
Qty: 180 TABLET | Refills: 3 | Status: SHIPPED | OUTPATIENT
Start: 2019-01-14 | End: 2019-01-14 | Stop reason: SDUPTHER

## 2019-01-22 PROBLEM — I48.20 CHRONIC ATRIAL FIBRILLATION (HCC): Status: ACTIVE | Noted: 2019-01-22

## 2019-02-01 ENCOUNTER — OFFICE VISIT (OUTPATIENT)
Dept: CARDIOLOGY CLINIC | Age: 84
End: 2019-02-01
Payer: MEDICARE

## 2019-02-01 VITALS
HEART RATE: 91 BPM | DIASTOLIC BLOOD PRESSURE: 84 MMHG | BODY MASS INDEX: 18.77 KG/M2 | WEIGHT: 119.6 LBS | OXYGEN SATURATION: 96 % | SYSTOLIC BLOOD PRESSURE: 158 MMHG | HEIGHT: 67 IN

## 2019-02-01 DIAGNOSIS — I25.83 CORONARY ARTERY DISEASE DUE TO LIPID RICH PLAQUE: Primary | ICD-10-CM

## 2019-02-01 DIAGNOSIS — I10 HYPERTENSION, UNSPECIFIED TYPE: ICD-10-CM

## 2019-02-01 DIAGNOSIS — I48.20 CHRONIC ATRIAL FIBRILLATION (HCC): ICD-10-CM

## 2019-02-01 DIAGNOSIS — E78.2 MIXED HYPERLIPIDEMIA: ICD-10-CM

## 2019-02-01 DIAGNOSIS — I63.9 CEREBROVASCULAR ACCIDENT (CVA), UNSPECIFIED MECHANISM (HCC): ICD-10-CM

## 2019-02-01 DIAGNOSIS — I25.10 CORONARY ARTERY DISEASE DUE TO LIPID RICH PLAQUE: Primary | ICD-10-CM

## 2019-02-01 PROCEDURE — G8427 DOCREV CUR MEDS BY ELIG CLIN: HCPCS | Performed by: INTERNAL MEDICINE

## 2019-02-01 PROCEDURE — 1123F ACP DISCUSS/DSCN MKR DOCD: CPT | Performed by: INTERNAL MEDICINE

## 2019-02-01 PROCEDURE — 99214 OFFICE O/P EST MOD 30 MIN: CPT | Performed by: INTERNAL MEDICINE

## 2019-02-01 PROCEDURE — G8598 ASA/ANTIPLAT THER USED: HCPCS | Performed by: INTERNAL MEDICINE

## 2019-02-01 PROCEDURE — G8484 FLU IMMUNIZE NO ADMIN: HCPCS | Performed by: INTERNAL MEDICINE

## 2019-02-01 PROCEDURE — 1090F PRES/ABSN URINE INCON ASSESS: CPT | Performed by: INTERNAL MEDICINE

## 2019-02-01 PROCEDURE — G8420 CALC BMI NORM PARAMETERS: HCPCS | Performed by: INTERNAL MEDICINE

## 2019-02-01 PROCEDURE — 1036F TOBACCO NON-USER: CPT | Performed by: INTERNAL MEDICINE

## 2019-02-01 PROCEDURE — 1101F PT FALLS ASSESS-DOCD LE1/YR: CPT | Performed by: INTERNAL MEDICINE

## 2019-02-01 PROCEDURE — 4040F PNEUMOC VAC/ADMIN/RCVD: CPT | Performed by: INTERNAL MEDICINE

## 2019-02-01 RX ORDER — LISINOPRIL 20 MG/1
20 TABLET ORAL DAILY
Qty: 90 TABLET | Refills: 3 | Status: SHIPPED | OUTPATIENT
Start: 2019-02-01 | End: 2020-01-30

## 2019-02-20 ENCOUNTER — TELEPHONE (OUTPATIENT)
Dept: INTERNAL MEDICINE CLINIC | Age: 84
End: 2019-02-20

## 2019-03-29 ENCOUNTER — OFFICE VISIT (OUTPATIENT)
Dept: INTERNAL MEDICINE CLINIC | Age: 84
End: 2019-03-29
Payer: MEDICARE

## 2019-03-29 VITALS
DIASTOLIC BLOOD PRESSURE: 68 MMHG | HEIGHT: 67 IN | BODY MASS INDEX: 21.35 KG/M2 | HEART RATE: 78 BPM | WEIGHT: 136 LBS | SYSTOLIC BLOOD PRESSURE: 150 MMHG

## 2019-03-29 DIAGNOSIS — F32.A DEPRESSION, UNSPECIFIED DEPRESSION TYPE: ICD-10-CM

## 2019-03-29 DIAGNOSIS — I10 ESSENTIAL HYPERTENSION: Primary | ICD-10-CM

## 2019-03-29 DIAGNOSIS — I25.83 CORONARY ARTERY DISEASE DUE TO LIPID RICH PLAQUE: ICD-10-CM

## 2019-03-29 DIAGNOSIS — I25.10 CORONARY ARTERY DISEASE DUE TO LIPID RICH PLAQUE: ICD-10-CM

## 2019-03-29 DIAGNOSIS — H91.93 HEARING PROBLEM OF BOTH EARS: ICD-10-CM

## 2019-03-29 DIAGNOSIS — H61.22 EXCESSIVE CERUMEN IN LEFT EAR CANAL: ICD-10-CM

## 2019-03-29 DIAGNOSIS — I10 ESSENTIAL HYPERTENSION: ICD-10-CM

## 2019-03-29 LAB
BACTERIA: ABNORMAL /HPF
BILIRUBIN URINE: NEGATIVE
BLOOD, URINE: NEGATIVE
CLARITY: ABNORMAL
COLOR: ABNORMAL
EPITHELIAL CELLS, UA: 3 /HPF (ref 0–5)
GLUCOSE URINE: NEGATIVE MG/DL
HCT VFR BLD CALC: 31.5 % (ref 36–48)
HEMOGLOBIN: 10.1 G/DL (ref 12–16)
HYALINE CASTS: 3 /LPF (ref 0–8)
KETONES, URINE: NEGATIVE MG/DL
LEUKOCYTE ESTERASE, URINE: ABNORMAL
MCH RBC QN AUTO: 28 PG (ref 26–34)
MCHC RBC AUTO-ENTMCNC: 32 G/DL (ref 31–36)
MCV RBC AUTO: 87.3 FL (ref 80–100)
MICROSCOPIC EXAMINATION: YES
NITRITE, URINE: NEGATIVE
PDW BLD-RTO: 17.2 % (ref 12.4–15.4)
PH UA: 6 (ref 5–8)
PLATELET # BLD: 240 K/UL (ref 135–450)
PMV BLD AUTO: 8.8 FL (ref 5–10.5)
PROTEIN UA: ABNORMAL MG/DL
RBC # BLD: 3.61 M/UL (ref 4–5.2)
RBC UA: 5 /HPF (ref 0–4)
SPECIFIC GRAVITY UA: 1.03 (ref 1–1.03)
URINE TYPE: ABNORMAL
UROBILINOGEN, URINE: 1 E.U./DL
WBC # BLD: 6 K/UL (ref 4–11)
WBC UA: 3 /HPF (ref 0–5)

## 2019-03-29 PROCEDURE — G8598 ASA/ANTIPLAT THER USED: HCPCS | Performed by: INTERNAL MEDICINE

## 2019-03-29 PROCEDURE — G8420 CALC BMI NORM PARAMETERS: HCPCS | Performed by: INTERNAL MEDICINE

## 2019-03-29 PROCEDURE — 1123F ACP DISCUSS/DSCN MKR DOCD: CPT | Performed by: INTERNAL MEDICINE

## 2019-03-29 PROCEDURE — 1090F PRES/ABSN URINE INCON ASSESS: CPT | Performed by: INTERNAL MEDICINE

## 2019-03-29 PROCEDURE — 99214 OFFICE O/P EST MOD 30 MIN: CPT | Performed by: INTERNAL MEDICINE

## 2019-03-29 PROCEDURE — 4040F PNEUMOC VAC/ADMIN/RCVD: CPT | Performed by: INTERNAL MEDICINE

## 2019-03-29 PROCEDURE — G8484 FLU IMMUNIZE NO ADMIN: HCPCS | Performed by: INTERNAL MEDICINE

## 2019-03-29 PROCEDURE — G8427 DOCREV CUR MEDS BY ELIG CLIN: HCPCS | Performed by: INTERNAL MEDICINE

## 2019-03-29 PROCEDURE — 1036F TOBACCO NON-USER: CPT | Performed by: INTERNAL MEDICINE

## 2019-03-29 RX ORDER — ATORVASTATIN CALCIUM 40 MG/1
40 TABLET, FILM COATED ORAL DAILY
COMMUNITY
End: 2019-03-29 | Stop reason: SDUPTHER

## 2019-03-29 RX ORDER — ESCITALOPRAM OXALATE 5 MG/1
5 TABLET ORAL DAILY
Qty: 90 TABLET | Refills: 1 | Status: SHIPPED | OUTPATIENT
Start: 2019-03-29 | End: 2019-06-03 | Stop reason: SDUPTHER

## 2019-03-29 RX ORDER — FERROUS SULFATE 325(65) MG
325 TABLET ORAL DAILY PRN
COMMUNITY

## 2019-03-29 RX ORDER — CARVEDILOL 12.5 MG/1
18.75 TABLET ORAL 2 TIMES DAILY WITH MEALS
COMMUNITY
End: 2019-06-24 | Stop reason: SDUPTHER

## 2019-03-29 RX ORDER — ATORVASTATIN CALCIUM 40 MG/1
40 TABLET, FILM COATED ORAL DAILY
Qty: 90 TABLET | Refills: 1 | Status: SHIPPED | OUTPATIENT
Start: 2019-03-29 | End: 2019-12-05 | Stop reason: SDUPTHER

## 2019-03-29 RX ORDER — HYDRALAZINE HYDROCHLORIDE 50 MG/1
50 TABLET, FILM COATED ORAL DAILY PRN
COMMUNITY
End: 2020-11-10 | Stop reason: SDUPTHER

## 2019-03-29 ASSESSMENT — ENCOUNTER SYMPTOMS
NAUSEA: 0
COUGH: 0
SHORTNESS OF BREATH: 0
ABDOMINAL PAIN: 0
VOMITING: 0

## 2019-03-30 DIAGNOSIS — D64.9 ANEMIA, UNSPECIFIED TYPE: Primary | ICD-10-CM

## 2019-03-30 LAB
ANION GAP SERPL CALCULATED.3IONS-SCNC: 11 MMOL/L (ref 3–16)
BUN BLDV-MCNC: 31 MG/DL (ref 7–20)
CALCIUM SERPL-MCNC: 10.2 MG/DL (ref 8.3–10.6)
CHLORIDE BLD-SCNC: 108 MMOL/L (ref 99–110)
CO2: 27 MMOL/L (ref 21–32)
CREAT SERPL-MCNC: 0.9 MG/DL (ref 0.6–1.2)
GFR AFRICAN AMERICAN: >60
GFR NON-AFRICAN AMERICAN: 59
GLUCOSE BLD-MCNC: 107 MG/DL (ref 70–99)
POTASSIUM SERPL-SCNC: 4.2 MMOL/L (ref 3.5–5.1)
SODIUM BLD-SCNC: 146 MMOL/L (ref 136–145)

## 2019-04-01 RX ORDER — CEPHALEXIN 500 MG/1
500 CAPSULE ORAL 3 TIMES DAILY
Qty: 21 CAPSULE | Refills: 0 | Status: SHIPPED | OUTPATIENT
Start: 2019-04-01 | End: 2019-07-31

## 2019-04-10 ENCOUNTER — TELEPHONE (OUTPATIENT)
Dept: INTERNAL MEDICINE CLINIC | Age: 84
End: 2019-04-10

## 2019-04-10 NOTE — TELEPHONE ENCOUNTER
Pt daughter calling ----has to cx pt appt for today--won't be able to get here in time---pt had explosive diarrhea this morning and pt needed cleaned up ---they were looking forward to seeing you today---your next open appt isn't until 5/20 is there somewhere you can work her in before that ---but not first thing in the am---please advise or call daughter Deepti. Thanks.

## 2019-04-23 ENCOUNTER — OFFICE VISIT (OUTPATIENT)
Dept: ENT CLINIC | Age: 84
End: 2019-04-23
Payer: MEDICARE

## 2019-04-23 ENCOUNTER — PROCEDURE VISIT (OUTPATIENT)
Dept: AUDIOLOGY | Age: 84
End: 2019-04-23
Payer: MEDICARE

## 2019-04-23 VITALS — SYSTOLIC BLOOD PRESSURE: 136 MMHG | RESPIRATION RATE: 94 BRPM | HEART RATE: 65 BPM | DIASTOLIC BLOOD PRESSURE: 70 MMHG

## 2019-04-23 DIAGNOSIS — H61.23 BILATERAL IMPACTED CERUMEN: Primary | ICD-10-CM

## 2019-04-23 DIAGNOSIS — H91.93 SEVERE HEARING LOSS OF BOTH EARS: Primary | ICD-10-CM

## 2019-04-23 DIAGNOSIS — H90.3 BILATERAL SENSORINEURAL HEARING LOSS: ICD-10-CM

## 2019-04-23 PROCEDURE — 92557 COMPREHENSIVE HEARING TEST: CPT | Performed by: AUDIOLOGIST

## 2019-04-23 PROCEDURE — 69210 REMOVE IMPACTED EAR WAX UNI: CPT | Performed by: OTOLARYNGOLOGY

## 2019-04-23 PROCEDURE — 99202 OFFICE O/P NEW SF 15 MIN: CPT | Performed by: OTOLARYNGOLOGY

## 2019-04-23 NOTE — PROGRESS NOTES
Subjective:      Patient ID: Adelaida Huang is a 80 y.o. female referred to audiology for hearing assessment. Patient was accompanied by daughter. The patient has had normal hearing until stroke occurred in 2016, according to her daughter. Assessment:      Minimal response measures obtained, given patients cognitive ability as a result of stroke. Patient demonstrated reliability that was consistent. Hearing loss appears to range moderately severe. Likely sensorineural in nature. Speech awareness threshold measures obtained. Plan:      Amplification trial would be an option. realistic expectations should be emphasized to family at length if family decides to pursue. SHANNEN Tomlinson

## 2019-06-03 ENCOUNTER — OFFICE VISIT (OUTPATIENT)
Dept: PSYCHIATRY | Age: 84
End: 2019-06-03
Payer: MEDICARE

## 2019-06-03 VITALS
WEIGHT: 125 LBS | DIASTOLIC BLOOD PRESSURE: 82 MMHG | HEART RATE: 76 BPM | BODY MASS INDEX: 19.58 KG/M2 | SYSTOLIC BLOOD PRESSURE: 140 MMHG

## 2019-06-03 DIAGNOSIS — F32.A DEPRESSION, UNSPECIFIED DEPRESSION TYPE: Primary | ICD-10-CM

## 2019-06-03 DIAGNOSIS — F41.9 ANXIETY DISORDER, UNSPECIFIED TYPE: ICD-10-CM

## 2019-06-03 DIAGNOSIS — F03.90: ICD-10-CM

## 2019-06-03 PROCEDURE — 99215 OFFICE O/P EST HI 40 MIN: CPT | Performed by: PSYCHIATRY & NEUROLOGY

## 2019-06-03 RX ORDER — ESCITALOPRAM OXALATE 10 MG/1
10 TABLET ORAL DAILY
Qty: 90 TABLET | Refills: 0 | Status: SHIPPED | OUTPATIENT
Start: 2019-06-03 | End: 2019-09-19 | Stop reason: SDUPTHER

## 2019-06-24 ENCOUNTER — TELEPHONE (OUTPATIENT)
Dept: CARDIOLOGY CLINIC | Age: 84
End: 2019-06-24

## 2019-06-24 DIAGNOSIS — I25.10 CORONARY ARTERY DISEASE DUE TO LIPID RICH PLAQUE: ICD-10-CM

## 2019-06-24 DIAGNOSIS — I25.83 CORONARY ARTERY DISEASE DUE TO LIPID RICH PLAQUE: ICD-10-CM

## 2019-06-24 RX ORDER — CARVEDILOL 12.5 MG/1
18.75 TABLET ORAL 2 TIMES DAILY WITH MEALS
Qty: 135 TABLET | Refills: 3 | Status: SHIPPED | OUTPATIENT
Start: 2019-06-24 | End: 2020-01-30

## 2019-06-24 NOTE — TELEPHONE ENCOUNTER
Medication Refill    When was your last appointment with cardiology?      (if  it's been more than 1 year, please go ahead and schedule an appointment with the physician while you have the patient on the phone)      Medication needing refilled: carvedilol    Doseage of the medication:  WAS INCREASED , ran out too soon needs new rx    How are you taking this medication (QD, BID, TID, QID, PRN):    Patient want a 30 or 90 day supply called in:    Which Pharmacy are we sending the medication to   [facility]   PUT ATTENTION DELVIN ON RX PLS       Medication Question/Concern    (if  it's been more than 1 year, please go ahead and schedule an appointment with the physician while you have the patient on the phone)    What is the name of the medication you need to speak with someone about? Doseage of the medication:    How are you taking this medication:    What issues/concerns are you having with this medication:      Medication Samples    (if  it's been more than 1 year, please go ahead and schedule an appointment with the physician while you have the patient on the phone)    Medication:    Doseage of the medication:    How are you taking this medication (QD, BID, TID, QID, PRN):     in the office or Mail to your home?

## 2019-06-25 NOTE — PROGRESS NOTES
(LIPITOR) 40 MG tablet Take 1 tablet by mouth daily 90 tablet 1    lisinopril (PRINIVIL;ZESTRIL) 20 MG tablet Take 1 tablet by mouth daily 90 tablet 3    clopidogrel (PLAVIX) 75 MG tablet Take 1 tablet by mouth daily 90 tablet 3    aspirin 81 MG EC tablet Take 1 tablet by mouth daily 90 tablet 3    Omega-3 Fatty Acids (FISH OIL) 1200 MG CPDR Take by mouth daily as needed        No current facility-administered medications for this visit.         O:  Wt Readings from Last 3 Encounters:   06/03/19 125 lb (56.7 kg)   03/29/19 136 lb (61.7 kg)   02/01/19 119 lb 9.6 oz (54.3 kg)     Temp Readings from Last 3 Encounters:   11/15/18 97.8 °F (36.6 °C) (Temporal)   04/28/18 97.9 °F (36.6 °C) (Temporal)   04/16/18 98.1 °F (36.7 °C) (Oral)     BP Readings from Last 3 Encounters:   06/03/19 (!) 140/82   04/23/19 136/70   03/29/19 (!) 150/68     Pulse Readings from Last 3 Encounters:   06/03/19 76   04/23/19 65   03/29/19 78       Mental Status Exam:   Appearance    alert, cooperative  Motor: Normal strength and tone, No abnormal movements, seated in wheelchair  Speech    +dysarthria 2/2 stroke / expressive aphasia  Mood/Affect    ok / normal affect  Thought Process    linear, goal directed and coherent  Thought Content    intact , no suicidal ideation  Associations    logical connections  Attention/Concentration    intact  Memory    Short term memory deficits  Insight/Judgement    limited / Intact    Labs:   Orders Only on 03/29/2019   Component Date Value Ref Range Status    Sodium 03/29/2019 146* 136 - 145 mmol/L Final    Potassium 03/29/2019 4.2  3.5 - 5.1 mmol/L Final    Chloride 03/29/2019 108  99 - 110 mmol/L Final    CO2 03/29/2019 27  21 - 32 mmol/L Final    Anion Gap 03/29/2019 11  3 - 16 Final    Glucose 03/29/2019 107* 70 - 99 mg/dL Final    BUN 03/29/2019 31* 7 - 20 mg/dL Final    CREATININE 03/29/2019 0.9  0.6 - 1.2 mg/dL Final    GFR Non- 03/29/2019 59* >60 Final    Comment: >60 mL/min/1.73m2 EGFR, calc. for ages 25 and older using the  MDRD formula (not corrected for weight), is valid for stable  renal function.  GFR  03/29/2019 >60  >60 Final    Comment: Chronic Kidney Disease: less than 60 ml/min/1.73 sq.m. Kidney Failure: less than 15 ml/min/1.73 sq.m. Results valid for patients 18 years and older.  Calcium 03/29/2019 10.2  8.3 - 10.6 mg/dL Final    WBC 03/29/2019 6.0  4.0 - 11.0 K/uL Final    RBC 03/29/2019 3.61* 4.00 - 5.20 M/uL Final    Hemoglobin 03/29/2019 10.1* 12.0 - 16.0 g/dL Final    Hematocrit 03/29/2019 31.5* 36.0 - 48.0 % Final    MCV 03/29/2019 87.3  80.0 - 100.0 fL Final    MCH 03/29/2019 28.0  26.0 - 34.0 pg Final    MCHC 03/29/2019 32.0  31.0 - 36.0 g/dL Final    RDW 03/29/2019 17.2* 12.4 - 15.4 % Final    Platelets 74/29/7663 240  135 - 450 K/uL Final    MPV 03/29/2019 8.8  5.0 - 10.5 fL Final         A:  81 yo F with prior CVA and resulting dementia, struggling with depression and anxiety that likely are secondary to her stroke, although there were probably pre-existing anxiety issues that were long standing. 1. Unspecified depressive disorder  2. Unspecified anxiety disorder  3. Major neurocognitive disorder 2/2 stroke  4. Sensorineural hearing loss    P:   1. Will increase lexapro to 10mg daily. 2. Encouraged f/u for hearing aids if appropriate, eye exam.     >50% of the visit was spent on counseling and education, primarily for the caregiver to understand her mother's condition. I encouraged her to seek whatever support may be available for her to help manage her mother and possibly her own individual counseling given that she is clearly anxious and overwhelmed in her care. Follow-up: RTC in 10 weeks.      Mayte Friend MD  Psychiatrist

## 2019-07-26 ENCOUNTER — APPOINTMENT (OUTPATIENT)
Dept: CT IMAGING | Age: 84
End: 2019-07-26
Payer: MEDICARE

## 2019-07-26 ENCOUNTER — HOSPITAL ENCOUNTER (EMERGENCY)
Age: 84
Discharge: HOME | End: 2019-07-27
Attending: EMERGENCY MEDICINE
Payer: MEDICARE
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
## 2019-07-26 DIAGNOSIS — S09.90XA INJURY OF HEAD, INITIAL ENCOUNTER: Primary | ICD-10-CM

## 2019-07-26 DIAGNOSIS — H27.132 POSTERIOR DISLOCATION OF LEFT LENS: ICD-10-CM

## 2019-07-26 LAB
ALBUMIN SERPL-MCNC: 4.3 G/DL (ref 3.5–5.2)
ALP BLD-CCNC: 127 U/L (ref 35–104)
ALT SERPL-CCNC: 18 U/L (ref 0–32)
ANION GAP SERPL CALCULATED.3IONS-SCNC: 7 MMOL/L (ref 7–16)
AST SERPL-CCNC: 34 U/L (ref 0–31)
BILIRUB SERPL-MCNC: 2 MG/DL (ref 0–1.2)
BUN BLDV-MCNC: 26 MG/DL (ref 8–23)
CALCIUM SERPL-MCNC: 11.2 MG/DL (ref 8.6–10.2)
CHLORIDE BLD-SCNC: 104 MMOL/L (ref 98–107)
CO2: 33 MMOL/L (ref 22–29)
CREAT SERPL-MCNC: 1.2 MG/DL (ref 0.5–1)
GFR AFRICAN AMERICAN: 51
GFR NON-AFRICAN AMERICAN: 43 ML/MIN/1.73
GLUCOSE BLD-MCNC: 118 MG/DL (ref 74–99)
HCT VFR BLD CALC: 42 % (ref 34–48)
HEMOGLOBIN: 13 G/DL (ref 11.5–15.5)
INR BLD: 1.1
MCH RBC QN AUTO: 28.5 PG (ref 26–35)
MCHC RBC AUTO-ENTMCNC: 31 % (ref 32–34.5)
MCV RBC AUTO: 92.1 FL (ref 80–99.9)
PDW BLD-RTO: 15.6 FL (ref 11.5–15)
PLATELET # BLD: 280 E9/L (ref 130–450)
PMV BLD AUTO: 10.1 FL (ref 7–12)
POTASSIUM SERPL-SCNC: 4.5 MMOL/L (ref 3.5–5)
PROTHROMBIN TIME: 13.2 SEC (ref 9.3–12.4)
RBC # BLD: 4.56 E12/L (ref 3.5–5.5)
SODIUM BLD-SCNC: 144 MMOL/L (ref 132–146)
TOTAL PROTEIN: 7.7 G/DL (ref 6.4–8.3)
WBC # BLD: 6.7 E9/L (ref 4.5–11.5)

## 2019-07-26 PROCEDURE — 85027 COMPLETE CBC AUTOMATED: CPT

## 2019-07-26 PROCEDURE — 84484 ASSAY OF TROPONIN QUANT: CPT

## 2019-07-26 PROCEDURE — 85610 PROTHROMBIN TIME: CPT

## 2019-07-26 PROCEDURE — 72125 CT NECK SPINE W/O DYE: CPT

## 2019-07-26 PROCEDURE — 36415 COLL VENOUS BLD VENIPUNCTURE: CPT

## 2019-07-26 PROCEDURE — 80053 COMPREHEN METABOLIC PANEL: CPT

## 2019-07-26 PROCEDURE — 70450 CT HEAD/BRAIN W/O DYE: CPT

## 2019-07-26 PROCEDURE — 99285 EMERGENCY DEPT VISIT HI MDM: CPT

## 2019-07-27 VITALS
TEMPERATURE: 97.3 F | OXYGEN SATURATION: 96 % | WEIGHT: 125 LBS | HEIGHT: 67 IN | HEART RATE: 83 BPM | RESPIRATION RATE: 18 BRPM | BODY MASS INDEX: 19.62 KG/M2 | SYSTOLIC BLOOD PRESSURE: 175 MMHG | DIASTOLIC BLOOD PRESSURE: 87 MMHG

## 2019-07-27 LAB
EKG ATRIAL RATE: 78 BPM
EKG Q-T INTERVAL: 376 MS
EKG QRS DURATION: 102 MS
EKG QTC CALCULATION (BAZETT): 428 MS
EKG R AXIS: 46 DEGREES
EKG T AXIS: 78 DEGREES
EKG VENTRICULAR RATE: 78 BPM
TROPONIN: 0.01 NG/ML (ref 0–0.03)

## 2019-07-27 PROCEDURE — 93005 ELECTROCARDIOGRAM TRACING: CPT | Performed by: EMERGENCY MEDICINE

## 2019-07-27 PROCEDURE — 93010 ELECTROCARDIOGRAM REPORT: CPT | Performed by: INTERNAL MEDICINE

## 2019-07-27 RX ORDER — HALOPERIDOL 5 MG/ML
2 INJECTION INTRAMUSCULAR ONCE
Status: DISCONTINUED | OUTPATIENT
Start: 2019-07-27 | End: 2019-07-27 | Stop reason: HOSPADM

## 2019-07-27 ASSESSMENT — ENCOUNTER SYMPTOMS
ABDOMINAL PAIN: 0
WHEEZING: 0
DIARRHEA: 0
NAUSEA: 0
SHORTNESS OF BREATH: 0
VOMITING: 0
BACK PAIN: 0
COUGH: 0

## 2019-07-27 NOTE — ED NOTES
Pt alert skin warm dry resp easy right arm flaccid from previous cva speech slurred moves right leg weaker than left      Leanne ELO Dickson  07/27/19 000

## 2019-07-27 NOTE — ED NOTES
Attempting to discontinue iv from left antecubital region pt became combative swinging left arm pt not answering questions     Michiel Curling, RN  07/27/19 4698

## 2019-07-27 NOTE — ED NOTES
Bed: 16  Expected date:   Expected time:   Means of arrival:   Comments:  ems     Keshia Rowan RN  07/26/19 0242

## 2019-07-27 NOTE — ED PROVIDER NOTES
eye with purple-yellow discoloration   Neck: Normal range of motion. Neck supple. No JVD present. No tracheal deviation present. Cardiovascular: Normal rate, normal heart sounds and intact distal pulses. Exam reveals no gallop and no friction rub. Irregular   Pulmonary/Chest: Effort normal and breath sounds normal. No stridor. No respiratory distress. She has no wheezes. She has no rales. Abdominal: Soft. Bowel sounds are normal. She exhibits no distension and no mass. There is no tenderness. There is no guarding. Musculoskeletal: She exhibits no edema or deformity. Neurological: She is alert. Dysarthric and dysphasic very difficult to understand speech but appears to answer appropriately; chronic contracture deformity muscle wasting in the right upper extremity   Skin: Skin is warm and dry. Capillary refill takes less than 2 seconds. She is not diaphoretic. No erythema. Nursing note and vitals reviewed. Procedures    MDM    ED Course as of Jul 27 0601   Sat Jul 27, 2019   0039 Patient's daughter is at bedside. She reiterates the story that the patient was taking a shower in her wheelchair when she turned her body to the side which caused her to go off balance falling backwards in the wheelchair hitting her head on the shower floor. Patient did not lose consciousness. She has been at her mental baseline since then. Daughter also had concerns over possible constipation recently and the patient states that she moved her bowels yesterday. She is also concerned over a laceration of the right upper lip that occurred several days ago the daughter believes she may have bit her lip. The laceration appears old and scabbed is not actively bleeding and does not appear infected. I updated the daughter on CT findings of no intracranial hemorrhage as well as possible dislocated lens.   The daughter reports the patient has been having vision changes for some time and she has plans for her to be seen by an eye doctor in the near future. [JL]   0245 Work-up was unrevealing for intracranial pathology. She has no new neurological deficits change from her baseline deficits from her previous stroke. There is an incidental lens dislocation noted on CT scan. The patient's daughter states patient has been having problems with her vision for some time and has plans to follow-up with an ophthalmologist.  Nakita Billow her with ophthalmology information for follow-up. We will discharge her home with her daughter. [JL]   [id number] Patient is becoming agitated and confused combative with staff and attempting to remove IV. We will give 2 mg of Haldol. [JL]      ED Course User Index  [JL] Amadou Kohler,        EKG: This EKG is signed and interpreted by me. Rate: 78  Rhythm: Atrial fibrillation  Interpretation: no acute changes, non-specific EKG and left ventricular hypertrophy  Comparison: stable as compared to patient's most recent EKG    --------------------------------------------- PAST HISTORY ---------------------------------------------  Past Medical History:  has a past medical history of CAD (coronary artery disease), CVA (cerebral vascular accident) ([facility] [address] and Hypertension. Past Surgical History:  has a past surgical history that includes Coronary angioplasty with stent (2007) and Cosmetic surgery. Social History:  reports that she has never smoked. She has never used smokeless tobacco. She reports that she does not drink alcohol. Family History: family history includes Cancer in her brother and father. The patients home medications have been reviewed.     Allergies: Prednisone and Tylenol [acetaminophen]    -------------------------------------------------- RESULTS -------------------------------------------------  Labs:  Results for orders placed or performed during the hospital encounter of 07/26/19   CBC   Result Value Ref Range    WBC 6.7 4.5 - 11.5 E9/L    RBC 4.56 3.50 - 5.50 E12/L Patient has history of dementia and per report patient is at her baseline. Patient awake and alert. Patient heart and lung exam normal abdomen is soft and nontender patient able to move extremities. Labs and CTs reviewed as well as EKG. CT shows no acute findings. Patient plan will be to discharge home per report family states that she has had some visual disturbance and is scheduled to see eye doctor. Patient family was made aware of lens dislocation.          Rafa Feliz MD  07/27/19 Dale Henry MD  07/27/19 0108

## 2019-07-27 NOTE — ED NOTES
Pt permitted another nurse to discontinue iv pt remains alert more calm voices no complaints discharge instructions given to daughter verbalized understanding pt discharged via wheelchair     Hardeep Richard RN  07/27/19 5009

## 2019-07-29 ENCOUNTER — TELEPHONE (OUTPATIENT)
Dept: INTERNAL MEDICINE CLINIC | Age: 84
End: 2019-07-29

## 2019-07-31 ENCOUNTER — OFFICE VISIT (OUTPATIENT)
Dept: PRIMARY CARE CLINIC | Age: 84
End: 2019-07-31
Payer: MEDICARE

## 2019-07-31 ENCOUNTER — TELEPHONE (OUTPATIENT)
Dept: INTERNAL MEDICINE CLINIC | Age: 84
End: 2019-07-31

## 2019-07-31 ENCOUNTER — HOSPITAL ENCOUNTER (OUTPATIENT)
Age: 84
Discharge: HOME | End: 2019-07-31
Payer: MEDICARE

## 2019-07-31 VITALS — HEART RATE: 89 BPM | OXYGEN SATURATION: 97 % | DIASTOLIC BLOOD PRESSURE: 86 MMHG | SYSTOLIC BLOOD PRESSURE: 161 MMHG

## 2019-07-31 DIAGNOSIS — R73.09 ELEVATED GLUCOSE: ICD-10-CM

## 2019-07-31 DIAGNOSIS — R74.8 ELEVATED ALKALINE PHOSPHATASE LEVEL: ICD-10-CM

## 2019-07-31 DIAGNOSIS — E78.2 MIXED HYPERLIPIDEMIA: ICD-10-CM

## 2019-07-31 DIAGNOSIS — Z86.79 HISTORY OF CORONARY ARTERY DISEASE: ICD-10-CM

## 2019-07-31 DIAGNOSIS — N17.9 AKI (ACUTE KIDNEY INJURY) (HCC): ICD-10-CM

## 2019-07-31 DIAGNOSIS — E83.52 HYPERCALCEMIA: ICD-10-CM

## 2019-07-31 DIAGNOSIS — Z86.73 HISTORY OF CVA (CEREBROVASCULAR ACCIDENT): ICD-10-CM

## 2019-07-31 DIAGNOSIS — F32.2 CURRENT SEVERE EPISODE OF MAJOR DEPRESSIVE DISORDER WITHOUT PSYCHOTIC FEATURES WITHOUT PRIOR EPISODE (HCC): ICD-10-CM

## 2019-07-31 DIAGNOSIS — F01.518 VASCULAR DEMENTIA WITH BEHAVIOR DISTURBANCE: ICD-10-CM

## 2019-07-31 DIAGNOSIS — I10 ESSENTIAL HYPERTENSION: ICD-10-CM
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
## 2019-07-31 DIAGNOSIS — E44.0 MODERATE PROTEIN-CALORIE MALNUTRITION (HCC): ICD-10-CM

## 2019-07-31 DIAGNOSIS — K59.00 CONSTIPATION, UNSPECIFIED CONSTIPATION TYPE: ICD-10-CM
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
## 2019-07-31 PROBLEM — F01.50 VASCULAR DEMENTIA WITHOUT BEHAVIORAL DISTURBANCE (HCC): Status: ACTIVE | Noted: 2019-07-31

## 2019-07-31 LAB
ALBUMIN SERPL-MCNC: 4.2 G/DL (ref 3.5–5.2)
ALP BLD-CCNC: 126 U/L (ref 35–104)
ALT SERPL-CCNC: 16 U/L (ref 0–32)
ANION GAP SERPL CALCULATED.3IONS-SCNC: 17 MMOL/L (ref 7–16)
AST SERPL-CCNC: 26 U/L (ref 0–31)
BILIRUB SERPL-MCNC: 1.8 MG/DL (ref 0–1.2)
BUN BLDV-MCNC: 26 MG/DL (ref 8–23)
CALCIUM SERPL-MCNC: 10.7 MG/DL (ref 8.6–10.2)
CHLORIDE BLD-SCNC: 111 MMOL/L (ref 98–107)
CHOLESTEROL, TOTAL: 111 MG/DL (ref 0–199)
CO2: 24 MMOL/L (ref 22–29)
CREAT SERPL-MCNC: 1.1 MG/DL (ref 0.5–1)
GAMMA GLUTAMYL TRANSFERASE: 29 U/L (ref 6–42)
GFR AFRICAN AMERICAN: 57
GFR NON-AFRICAN AMERICAN: 47 ML/MIN/1.73
GLUCOSE BLD-MCNC: 116 MG/DL (ref 74–99)
HBA1C MFR BLD: 5.6 %
HDLC SERPL-MCNC: 54 MG/DL
LDL CHOLESTEROL CALCULATED: 42 MG/DL (ref 0–99)
PARATHYROID HORMONE INTACT: 101 PG/ML (ref 15–65)
POTASSIUM SERPL-SCNC: 4 MMOL/L (ref 3.5–5)
SODIUM BLD-SCNC: 152 MMOL/L (ref 132–146)
TOTAL PROTEIN: 7.4 G/DL (ref 6.4–8.3)
TRIGL SERPL-MCNC: 75 MG/DL (ref 0–149)
VLDLC SERPL CALC-MCNC: 15 MG/DL

## 2019-07-31 PROCEDURE — 80061 LIPID PANEL: CPT

## 2019-07-31 PROCEDURE — 1123F ACP DISCUSS/DSCN MKR DOCD: CPT | Performed by: FAMILY MEDICINE

## 2019-07-31 PROCEDURE — 83970 ASSAY OF PARATHORMONE: CPT

## 2019-07-31 PROCEDURE — 80053 COMPREHEN METABOLIC PANEL: CPT

## 2019-07-31 PROCEDURE — G8427 DOCREV CUR MEDS BY ELIG CLIN: HCPCS | Performed by: FAMILY MEDICINE

## 2019-07-31 PROCEDURE — 4040F PNEUMOC VAC/ADMIN/RCVD: CPT | Performed by: FAMILY MEDICINE

## 2019-07-31 PROCEDURE — 1036F TOBACCO NON-USER: CPT | Performed by: FAMILY MEDICINE

## 2019-07-31 PROCEDURE — G8598 ASA/ANTIPLAT THER USED: HCPCS | Performed by: FAMILY MEDICINE

## 2019-07-31 PROCEDURE — 82977 ASSAY OF GGT: CPT

## 2019-07-31 PROCEDURE — 36415 COLL VENOUS BLD VENIPUNCTURE: CPT

## 2019-07-31 PROCEDURE — 99205 OFFICE O/P NEW HI 60 MIN: CPT | Performed by: FAMILY MEDICINE

## 2019-07-31 PROCEDURE — 1090F PRES/ABSN URINE INCON ASSESS: CPT | Performed by: FAMILY MEDICINE

## 2019-07-31 PROCEDURE — 83036 HEMOGLOBIN GLYCOSYLATED A1C: CPT | Performed by: FAMILY MEDICINE

## 2019-07-31 PROCEDURE — G8420 CALC BMI NORM PARAMETERS: HCPCS | Performed by: FAMILY MEDICINE

## 2019-07-31 RX ORDER — DOCUSATE SODIUM 100 MG/1
100 CAPSULE, LIQUID FILLED ORAL 2 TIMES DAILY
Qty: 60 CAPSULE | Refills: 0 | Status: SHIPPED | OUTPATIENT
Start: 2019-07-31 | End: 2019-08-30

## 2019-07-31 NOTE — PROGRESS NOTES
[facility])  Family Medicine Outpatient        SUBJECTIVE:  CC: had concerns including Establish Care (recent fall. Was seen at [facility] on 7/26/19). Guanako Bowers presented to the clinic for a routine visit. Jackelyn Keith is an 80year old female presenting to the office today after being seen in the ED 7/26/19 for a slip and fall in the shower of a hotel bathroom. With reports of neck pain and hitting her head a CT of her cervical spine with acute finding of left ocular lens displacement. She was discharged home to follow up with primary care. The patient has several previous comorbid conditions including previous cva with residual left arm weakness and reported episodic delirum, dementia, cad, constipation, htn, hld, depression, and protein malnutrition. The patient is cared for by her daughter and they live [place]. They are in town reportedly for business and will be returning to [place] soon, but may be returning more frequently and for eye surgery for her mom. They report this area as there, \"home [place] home. \"     EKG 7/27/19  Atrial fibrillation  Minimal voltage criteria for LVH, may be normal variant  Nonspecific ST and T wave abnormality  Abnormal ECG  No previous ECGs available    Cervical Spine CT WO Contrast 7/26/19   FINDINGS:    Vertebrae: Mild dextroconvex curvature. Nonspecific straightening of the    cervical lordosis. Trace anterolisthesis of C3 on C4 and C7 on T1. Vertebral    body heights are preserved. Moderate to severe degenerative change about the    dens. Moderate to severe prevertebral osteophytosis. There are bilateral facet    joint degenerative changes more prominent on the left. Discs/Spinal canal/Neural foramina: There are multilevel cervical central and    foraminal stenoses. Head CT WO Contrast 7/26/19  FINDINGS:    Brain: Decreased attenuation of the supratentorial white matter is likely    secondary to chronic microvascular ischemia.  No acute

## 2019-08-01 ENCOUNTER — TELEPHONE (OUTPATIENT)
Dept: CARDIOLOGY CLINIC | Age: 84
End: 2019-08-01

## 2019-08-01 ENCOUNTER — TELEPHONE (OUTPATIENT)
Dept: PRIMARY CARE CLINIC | Age: 84
End: 2019-08-01

## 2019-08-01 DIAGNOSIS — E21.0 PRIMARY HYPERPARATHYROIDISM (HCC): ICD-10-CM

## 2019-08-01 DIAGNOSIS — E87.0 HYPERNATREMIA: Primary | ICD-10-CM

## 2019-08-01 NOTE — TELEPHONE ENCOUNTER
Spoke with Romario Hernandez at [facility] regarding Fely Lawson, rescheduled for September. Romario Hernandez states that they should have sent a clearance form for Fely Lawson to proceed with cataract surgery, have not received from. Romario Hernandez will call and have them send today, as BNN will be OOT next week. She will also call back with their office contact information. Will call again if we do not receive form.

## 2019-08-01 NOTE — TELEPHONE ENCOUNTER
Patient can see endocrine in [place], placed local referral as well. If patient has a cardio apt in [place] tomorrow, she should go.

## 2019-08-02 ENCOUNTER — HOSPITAL ENCOUNTER (OUTPATIENT)
Age: 84
Discharge: HOME | End: 2019-08-02
Payer: MEDICARE

## 2019-08-02 ENCOUNTER — TELEPHONE (OUTPATIENT)
Dept: PRIMARY CARE CLINIC | Age: 84
End: 2019-08-02

## 2019-08-02 DIAGNOSIS — E87.0 HYPERNATREMIA: ICD-10-CM

## 2019-08-02 PROBLEM — R73.09 ELEVATED GLUCOSE: Status: ACTIVE | Noted: 2019-08-02

## 2019-08-02 PROBLEM — N17.9 AKI (ACUTE KIDNEY INJURY) (HCC): Status: ACTIVE | Noted: 2019-08-02

## 2019-08-02 PROBLEM — R74.8 ELEVATED ALKALINE PHOSPHATASE LEVEL: Status: ACTIVE | Noted: 2019-08-02

## 2019-08-02 PROBLEM — E83.52 HYPERCALCEMIA: Status: ACTIVE | Noted: 2019-08-02

## 2019-08-02 PROBLEM — K59.00 CONSTIPATION: Status: ACTIVE | Noted: 2019-08-02

## 2019-08-02 LAB
ANION GAP SERPL CALCULATED.3IONS-SCNC: 10 MMOL/L (ref 7–16)
BUN BLDV-MCNC: 29 MG/DL (ref 8–23)
CALCIUM SERPL-MCNC: 10.2 MG/DL (ref 8.6–10.2)
CHLORIDE BLD-SCNC: 108 MMOL/L (ref 98–107)
CO2: 28 MMOL/L (ref 22–29)
CREAT SERPL-MCNC: 1 MG/DL (ref 0.5–1)
GFR AFRICAN AMERICAN: >60
GFR NON-AFRICAN AMERICAN: 52 ML/MIN/1.73
GLUCOSE BLD-MCNC: 113 MG/DL (ref 74–99)
POTASSIUM SERPL-SCNC: 4.2 MMOL/L (ref 3.5–5)
SODIUM BLD-SCNC: 146 MMOL/L (ref 132–146)

## 2019-08-02 PROCEDURE — 36415 COLL VENOUS BLD VENIPUNCTURE: CPT

## 2019-08-02 PROCEDURE — 80048 BASIC METABOLIC PNL TOTAL CA: CPT

## 2019-08-02 ASSESSMENT — ENCOUNTER SYMPTOMS
DIARRHEA: 0
ABDOMINAL PAIN: 0
COUGH: 0
CONSTIPATION: 1
NAUSEA: 0
VOMITING: 0
SHORTNESS OF BREATH: 0
WHEEZING: 0
SINUS PRESSURE: 0

## 2019-08-02 NOTE — TELEPHONE ENCOUNTER
LVMM for daughter that lab order and referral placed, and she can call the office back with any questions

## 2019-08-02 NOTE — TELEPHONE ENCOUNTER
I saw the patient for a subsequent visit from the ER. She is more than welcome to get a copy of the ER record/note with the initial detail review of her fall. My note is complete. If the patient is back in [place] she should get repeat bmp or if still here get it today. When I saw patient was in the other day there was no pain. Do not recommend nsaids. Recommend rest and ice. If pain persist should follow up.

## 2019-08-12 ENCOUNTER — TELEPHONE (OUTPATIENT)
Dept: INTERNAL MEDICINE CLINIC | Age: 84
End: 2019-08-12

## 2019-08-14 ENCOUNTER — TELEPHONE (OUTPATIENT)
Dept: CARDIOLOGY CLINIC | Age: 84
End: 2019-08-14

## 2019-08-14 NOTE — TELEPHONE ENCOUNTER
Alfred Baca is calling to see how her clearance is going on the cataract surgery? She said that the Eye [facility] is still awaiting approval.  Please call to advise. Thank you.

## 2019-08-16 ENCOUNTER — TELEPHONE (OUTPATIENT)
Dept: ENT CLINIC | Age: 84
End: 2019-08-16

## 2019-08-16 ENCOUNTER — TELEPHONE (OUTPATIENT)
Dept: CARDIOLOGY CLINIC | Age: 84
End: 2019-08-16

## 2019-08-16 NOTE — TELEPHONE ENCOUNTER
Deepti called in wanted to make sure BNN ok with Dr. Caitlin Freeman doing her cataract surgery without stopping Plavix. Advised that it is up to Dr. Caitlin Freeman whether he needs Galileo Da Silva to hold Plavix, but per [facility] she should continue ASA. Deepti wanted [facility] to be aware of eye drops prescribed to Galileo Da Silva, Prednisone eye drops, Ofloxacin 0.3 eyedrop, Ketorolac 0.5 eye solution. BNN aware. Deepti was also worried that if Galileo Da Silva had elevated BP during her cataract surgery what she should do in regards to medications. I advised her that at this time we will not change anything, but if Galileo Da Silva has PERSISTENTLY elevated BP after the procedure to call our office. If BP persistently elevated with headaches, dizziness, or other symptoms to take Galileo Da Silva to ER (as procedure is on Friday). Samuelmohit verbalized understanding.

## 2019-08-23 ENCOUNTER — HOSPITAL ENCOUNTER (EMERGENCY)
Age: 84
Discharge: HOME | End: 2019-08-23
Attending: EMERGENCY MEDICINE
Payer: MEDICARE

## 2019-08-23 VITALS
BODY MASS INDEX: 19.62 KG/M2 | RESPIRATION RATE: 16 BRPM | OXYGEN SATURATION: 96 % | DIASTOLIC BLOOD PRESSURE: 84 MMHG | WEIGHT: 125 LBS | HEART RATE: 69 BPM | HEIGHT: 67 IN | SYSTOLIC BLOOD PRESSURE: 161 MMHG | TEMPERATURE: 96.5 F

## 2019-08-23 DIAGNOSIS — T50.905A MEDICATION SIDE EFFECT, INITIAL ENCOUNTER: Primary | ICD-10-CM

## 2019-08-23 LAB
ALBUMIN SERPL-MCNC: 3.6 G/DL (ref 3.5–5.2)
ALP BLD-CCNC: 105 U/L (ref 35–104)
ALT SERPL-CCNC: 17 U/L (ref 0–32)
ANION GAP SERPL CALCULATED.3IONS-SCNC: 8 MMOL/L (ref 7–16)
AST SERPL-CCNC: 25 U/L (ref 0–31)
BASOPHILS ABSOLUTE: 0.06 E9/L (ref 0–0.2)
BASOPHILS RELATIVE PERCENT: 0.8 % (ref 0–2)
BILIRUB SERPL-MCNC: 1.5 MG/DL (ref 0–1.2)
BUN BLDV-MCNC: 25 MG/DL (ref 8–23)
CALCIUM SERPL-MCNC: 10.1 MG/DL (ref 8.6–10.2)
CHLORIDE BLD-SCNC: 104 MMOL/L (ref 98–107)
CHP ED QC CHECK: NORMAL
CO2: 30 MMOL/L (ref 22–29)
CREAT SERPL-MCNC: 1.1 MG/DL (ref 0.5–1)
EKG ATRIAL RATE: 500 BPM
EKG Q-T INTERVAL: 434 MS
EKG QRS DURATION: 100 MS
EKG QTC CALCULATION (BAZETT): 429 MS
EKG R AXIS: 17 DEGREES
EKG T AXIS: 78 DEGREES
EKG VENTRICULAR RATE: 59 BPM
EOSINOPHILS ABSOLUTE: 0.22 E9/L (ref 0.05–0.5)
EOSINOPHILS RELATIVE PERCENT: 2.8 % (ref 0–6)
GFR AFRICAN AMERICAN: 57
GFR NON-AFRICAN AMERICAN: 47 ML/MIN/1.73
GLUCOSE BLD-MCNC: 111 MG/DL (ref 74–99)
GLUCOSE BLD-MCNC: 120 MG/DL
HCT VFR BLD CALC: 36 % (ref 34–48)
HEMOGLOBIN: 11.2 G/DL (ref 11.5–15.5)
IMMATURE GRANULOCYTES #: 0.01 E9/L
IMMATURE GRANULOCYTES %: 0.1 % (ref 0–5)
LYMPHOCYTES ABSOLUTE: 3.05 E9/L (ref 1.5–4)
LYMPHOCYTES RELATIVE PERCENT: 38.3 % (ref 20–42)
MCH RBC QN AUTO: 29.2 PG (ref 26–35)
MCHC RBC AUTO-ENTMCNC: 31.1 % (ref 32–34.5)
MCV RBC AUTO: 94 FL (ref 80–99.9)
METER GLUCOSE: 120 MG/DL (ref 74–99)
MONOCYTES ABSOLUTE: 0.68 E9/L (ref 0.1–0.95)
MONOCYTES RELATIVE PERCENT: 8.5 % (ref 2–12)
NEUTROPHILS ABSOLUTE: 3.94 E9/L (ref 1.8–7.3)
NEUTROPHILS RELATIVE PERCENT: 49.5 % (ref 43–80)
PDW BLD-RTO: 14.8 FL (ref 11.5–15)
PLATELET # BLD: 212 E9/L (ref 130–450)
PMV BLD AUTO: 9.9 FL (ref 7–12)
POTASSIUM REFLEX MAGNESIUM: 4.2 MMOL/L (ref 3.5–5)
RBC # BLD: 3.83 E12/L (ref 3.5–5.5)
SODIUM BLD-SCNC: 142 MMOL/L (ref 132–146)
TOTAL PROTEIN: 6.3 G/DL (ref 6.4–8.3)
WBC # BLD: 8 E9/L (ref 4.5–11.5)

## 2019-08-23 PROCEDURE — 2580000003 HC RX 258: Performed by: EMERGENCY MEDICINE

## 2019-08-23 PROCEDURE — 96360 HYDRATION IV INFUSION INIT: CPT

## 2019-08-23 PROCEDURE — 80053 COMPREHEN METABOLIC PANEL: CPT

## 2019-08-23 PROCEDURE — 99283 EMERGENCY DEPT VISIT LOW MDM: CPT

## 2019-08-23 PROCEDURE — 85025 COMPLETE CBC W/AUTO DIFF WBC: CPT

## 2019-08-23 PROCEDURE — 82962 GLUCOSE BLOOD TEST: CPT

## 2019-08-23 PROCEDURE — 96361 HYDRATE IV INFUSION ADD-ON: CPT

## 2019-08-23 PROCEDURE — 93005 ELECTROCARDIOGRAM TRACING: CPT | Performed by: EMERGENCY MEDICINE

## 2019-08-23 RX ORDER — 0.9 % SODIUM CHLORIDE 0.9 %
500 INTRAVENOUS SOLUTION INTRAVENOUS ONCE
Status: COMPLETED | OUTPATIENT
Start: 2019-08-23 | End: 2019-08-23

## 2019-08-23 RX ADMIN — SODIUM CHLORIDE 500 ML: 9 INJECTION, SOLUTION INTRAVENOUS at 17:13
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
## 2019-09-04 NOTE — PROGRESS NOTES
by mouth daily 2/1/19  Yes Rosanna Pathak DO   clopidogrel (PLAVIX) 75 MG tablet Take 1 tablet by mouth daily 1/14/19  Yes Rosanna Pathak DO   aspirin 81 MG EC tablet Take 1 tablet by mouth daily 7/6/18  Yes Rosanna Pathak DO       Allergies:  Prednisone and Tylenol [acetaminophen]     Review of Systems:    [x]Full ROS obtained and negative except as mentioned in HPI    Physical Examination:    BP (!) 114/42 (Site: Left Upper Arm, Position: Sitting, Cuff Size: Medium Adult)   Pulse 72   Ht 5' 7\" (1.702 m)   Wt 128 lb 14.4 oz (58.5 kg)   SpO2 94%   BMI 20.19 kg/m²   Wt Readings from Last 3 Encounters:   09/10/19 128 lb 14.4 oz (58.5 kg)   08/23/19 125 lb (56.7 kg)   07/26/19 125 lb (56.7 kg)     Vitals:    09/10/19 1450   BP: (!) 114/42   Pulse: 72   SpO2: 94%       · GENERAL: Well developed, well nourished, no acute distress Alert, flat affect, pleasant, quiet but answers appropriately   · PSYCH: Normal mood and affect   · SKIN: Warm and dry  · HEENT: Normocephalic, atraumatic, Sclera non-icteric, mucous membranes moist  · NECK: supple, JVP normal  · CARDIAC: Normal PMI, regular rate and rhythm, normal S1S2, no murmur, rub, or gallop  · RESPIRATORY: Normal respiratory effort, clear to auscultation bilaterally  · EXTREMITIES: no edema or clubbing, +2 pulses bilaterally   · MUSCULOSKELETAL: No joint swelling or tenderness, no chest wall tenderness  · GASTROINTESTINAL: normal bowel sounds, soft, non-tender      Labs:  Lab Review   Admission on 08/23/2019, Discharged on 08/23/2019   Component Date Value    Ventricular Rate 08/23/2019 59     Atrial Rate 08/23/2019 500     QRS Duration 08/23/2019 100     Q-T Interval 08/23/2019 434     QTc Calculation (Bazett) 08/23/2019 429     R Axis 08/23/2019 17     T Axis 08/23/2019 78     Glucose 08/23/2019 120     QC OK? 08/23/2019 yes.      Meter Glucose 08/23/2019 120*    WBC 08/23/2019 8.0     RBC 08/23/2019 3.83     Hemoglobin 08/23/2019 11.2*    Hematocrit 08/23/2019 36.0     MCV 08/23/2019 94.0     MCH 08/23/2019 29.2     MCHC 08/23/2019 31.1*    RDW 08/23/2019 14.8     Platelets 70/40/6366 212     MPV 08/23/2019 9.9     Neutrophils % 08/23/2019 49.5     Immature Granulocytes % 08/23/2019 0.1     Lymphocytes % 08/23/2019 38.3     Monocytes % 08/23/2019 8.5     Eosinophils % 08/23/2019 2.8     Basophils % 08/23/2019 0.8     Neutrophils Absolute 08/23/2019 3.94     Immature Granulocytes # 08/23/2019 0.01     Lymphocytes Absolute 08/23/2019 3.05     Monocytes Absolute 08/23/2019 0.68     Eosinophils Absolute 08/23/2019 0.22     Basophils Absolute 08/23/2019 0.06     Sodium 08/23/2019 142     Potassium reflex Magnesi* 08/23/2019 4.2     Chloride 08/23/2019 104     CO2 08/23/2019 30*    Anion Gap 08/23/2019 8     Glucose 08/23/2019 111*    BUN 08/23/2019 25*    CREATININE 08/23/2019 1.1*    GFR Non- 08/23/2019 47     GFR  08/23/2019 57     Calcium 08/23/2019 10.1     Total Protein 08/23/2019 6.3*    Alb 08/23/2019 3.6     Total Bilirubin 08/23/2019 1.5*    Alkaline Phosphatase 08/23/2019 105*    ALT 08/23/2019 17     AST 08/23/2019 25    Hospital Outpatient Visit on 08/02/2019   Component Date Value    Sodium 08/02/2019 146     Potassium 08/02/2019 4.2     Chloride 08/02/2019 108*    CO2 08/02/2019 28     Anion Gap 08/02/2019 10     Glucose 08/02/2019 113*    BUN 08/02/2019 29*    CREATININE 08/02/2019 1.0     GFR Non- 08/02/2019 52     GFR  08/02/2019 >60     Calcium 08/02/2019 10.2    Hospital Outpatient Visit on 07/31/2019   Component Date Value    PTH 07/31/2019 101*    Cholesterol, Total 07/31/2019 111     Triglycerides 07/31/2019 75     HDL 07/31/2019 54     LDL Calculated 07/31/2019 42     VLDL Cholesterol Calcula* 07/31/2019 15     Sodium 07/31/2019 152*    Potassium 07/31/2019 4.0     Chloride 07/31/2019 111*    CO2 07/31/2019 24     and systolic function with an EF 60%.     Moderate concentric left ventricular hypertrophy.     Diastolic filling parameters suggests diastolic function.     Moderate mitral regurgitation     The left atrium is dilated.     Moderate tricuspid regurgitation. RVSP 44mmHg.     The right atrium is mildly dilated.     Mild pulmonic regurgitation.       Impression/Recommendations    Ms. Lynne Caro is a 80 y.o. female patient with:    Stable ischemic heart disease with distant stent reported  Chronic Diastolic HF (LVEF 86% with moderate MR, moderate TR, mild PH) , ACC/AHA Stage C, NYHA Class II  Chronic atrial fibrillation, rate controlled, [id number]= 8 Hx. CVA, not on [facility] with gait dysfunction/ recent fall  Dysphagia, with hx. aspiration pneumonia   Hypertension  Dyslipidemia       Brianne remains on GDMT with no necessary changes at this time:    ASA 81 mg  Clopidogrel 75 mg  Atorvastatin 40 mg   Carvedilol 12.5 mg bid  Lisinopril 20 mg daily    Hydralazine 50 mg PO PRN SBP > 160   Lasix 20 mg daily PRN; avoiding diuretics due to poor PO intake/dehydration. Weight stable at this time. I will see Marti Mayorga in nine month follow up or sooner if needed. Thank you for allowing me to participate in the care of your patient. Please do not hesitate to call. Rosanna Pathak D.O., [facility]  Interventional Cardiology     o: [phone]  [address] [place] [address], [address], [address] [facility]    NOTE:  This report was transcribed using voice recognition software. Every effort was made to ensure accuracy; however, inadvertent computerized transcription errors may be present. Scribe's Attestation: This note was scribed in the presence of Dr. Sherrill Gomes DO by LEO Cardona.    Aiden Ruiz, have personally performed the services described in this documentation as scribed by Brigida Cohn. LEO Pham in my presence, and it is both accurate and complete. An electronic signature was used to authenticate this note.

## 2019-09-10 ENCOUNTER — OFFICE VISIT (OUTPATIENT)
Dept: CARDIOLOGY CLINIC | Age: 84
End: 2019-09-10
Payer: MEDICARE

## 2019-09-10 VITALS
OXYGEN SATURATION: 94 % | SYSTOLIC BLOOD PRESSURE: 114 MMHG | HEIGHT: 67 IN | BODY MASS INDEX: 20.23 KG/M2 | DIASTOLIC BLOOD PRESSURE: 42 MMHG | WEIGHT: 128.9 LBS | HEART RATE: 72 BPM

## 2019-09-10 DIAGNOSIS — E78.2 MIXED HYPERLIPIDEMIA: ICD-10-CM

## 2019-09-10 DIAGNOSIS — I25.83 CORONARY ARTERY DISEASE DUE TO LIPID RICH PLAQUE: Primary | ICD-10-CM

## 2019-09-10 DIAGNOSIS — I50.32 CHRONIC DIASTOLIC HEART FAILURE (HCC): ICD-10-CM

## 2019-09-10 DIAGNOSIS — I48.20 CHRONIC ATRIAL FIBRILLATION (HCC): ICD-10-CM

## 2019-09-10 DIAGNOSIS — I25.10 CORONARY ARTERY DISEASE DUE TO LIPID RICH PLAQUE: Primary | ICD-10-CM

## 2019-09-10 DIAGNOSIS — I10 ESSENTIAL HYPERTENSION: ICD-10-CM

## 2019-09-10 PROCEDURE — 1123F ACP DISCUSS/DSCN MKR DOCD: CPT | Performed by: INTERNAL MEDICINE

## 2019-09-10 PROCEDURE — 4040F PNEUMOC VAC/ADMIN/RCVD: CPT | Performed by: INTERNAL MEDICINE

## 2019-09-10 PROCEDURE — 1090F PRES/ABSN URINE INCON ASSESS: CPT | Performed by: INTERNAL MEDICINE

## 2019-09-10 PROCEDURE — 99214 OFFICE O/P EST MOD 30 MIN: CPT | Performed by: INTERNAL MEDICINE

## 2019-09-10 PROCEDURE — G8420 CALC BMI NORM PARAMETERS: HCPCS | Performed by: INTERNAL MEDICINE

## 2019-09-10 PROCEDURE — 1036F TOBACCO NON-USER: CPT | Performed by: INTERNAL MEDICINE

## 2019-09-10 PROCEDURE — G8427 DOCREV CUR MEDS BY ELIG CLIN: HCPCS | Performed by: INTERNAL MEDICINE

## 2019-09-10 PROCEDURE — G8598 ASA/ANTIPLAT THER USED: HCPCS | Performed by: INTERNAL MEDICINE

## 2019-09-19 ENCOUNTER — OFFICE VISIT (OUTPATIENT)
Dept: INTERNAL MEDICINE CLINIC | Age: 84
End: 2019-09-19
Payer: MEDICARE

## 2019-09-19 VITALS
WEIGHT: 123 LBS | BODY MASS INDEX: 19.3 KG/M2 | HEART RATE: 84 BPM | SYSTOLIC BLOOD PRESSURE: 124 MMHG | HEIGHT: 67 IN | DIASTOLIC BLOOD PRESSURE: 60 MMHG

## 2019-09-19 DIAGNOSIS — I50.32 CHRONIC DIASTOLIC HEART FAILURE (HCC): ICD-10-CM

## 2019-09-19 DIAGNOSIS — E83.52 SERUM CALCIUM ELEVATED: ICD-10-CM

## 2019-09-19 DIAGNOSIS — I10 ESSENTIAL HYPERTENSION: Primary | ICD-10-CM

## 2019-09-19 DIAGNOSIS — Z23 NEED FOR INFLUENZA VACCINATION: ICD-10-CM

## 2019-09-19 DIAGNOSIS — E86.0 DEHYDRATION: ICD-10-CM

## 2019-09-19 DIAGNOSIS — R74.8 ELEVATED LIVER ENZYMES: ICD-10-CM

## 2019-09-19 DIAGNOSIS — F32.A DEPRESSION, UNSPECIFIED DEPRESSION TYPE: ICD-10-CM

## 2019-09-19 DIAGNOSIS — N39.0 URINARY TRACT INFECTION WITHOUT HEMATURIA, SITE UNSPECIFIED: ICD-10-CM

## 2019-09-19 LAB
ALBUMIN SERPL-MCNC: 4 G/DL (ref 3.4–5)
ALP BLD-CCNC: 106 U/L (ref 40–129)
ALT SERPL-CCNC: 16 U/L (ref 10–40)
ANION GAP SERPL CALCULATED.3IONS-SCNC: 11 MMOL/L (ref 3–16)
AST SERPL-CCNC: 24 U/L (ref 15–37)
BILIRUB SERPL-MCNC: 1.1 MG/DL (ref 0–1)
BILIRUBIN DIRECT: 0.4 MG/DL (ref 0–0.3)
BILIRUBIN, INDIRECT: 0.7 MG/DL (ref 0–1)
BUN BLDV-MCNC: 27 MG/DL (ref 7–20)
CALCIUM SERPL-MCNC: 10.1 MG/DL (ref 8.3–10.6)
CHLORIDE BLD-SCNC: 102 MMOL/L (ref 99–110)
CO2: 29 MMOL/L (ref 21–32)
CREAT SERPL-MCNC: 0.9 MG/DL (ref 0.6–1.2)
GAMMA GLUTAMYL TRANSFERASE: 30 U/L (ref 5–36)
GFR AFRICAN AMERICAN: >60
GFR NON-AFRICAN AMERICAN: 59
GLUCOSE BLD-MCNC: 117 MG/DL (ref 70–99)
PARATHYROID HORMONE INTACT: 100.9 PG/ML (ref 14–72)
POTASSIUM SERPL-SCNC: 4.7 MMOL/L (ref 3.5–5.1)
SODIUM BLD-SCNC: 142 MMOL/L (ref 136–145)
TOTAL PROTEIN: 6.6 G/DL (ref 6.4–8.2)

## 2019-09-19 PROCEDURE — G8420 CALC BMI NORM PARAMETERS: HCPCS | Performed by: INTERNAL MEDICINE

## 2019-09-19 PROCEDURE — 1123F ACP DISCUSS/DSCN MKR DOCD: CPT | Performed by: INTERNAL MEDICINE

## 2019-09-19 PROCEDURE — 90653 IIV ADJUVANT VACCINE IM: CPT | Performed by: INTERNAL MEDICINE

## 2019-09-19 PROCEDURE — G8598 ASA/ANTIPLAT THER USED: HCPCS | Performed by: INTERNAL MEDICINE

## 2019-09-19 PROCEDURE — G8427 DOCREV CUR MEDS BY ELIG CLIN: HCPCS | Performed by: INTERNAL MEDICINE

## 2019-09-19 PROCEDURE — 1090F PRES/ABSN URINE INCON ASSESS: CPT | Performed by: INTERNAL MEDICINE

## 2019-09-19 PROCEDURE — 99214 OFFICE O/P EST MOD 30 MIN: CPT | Performed by: INTERNAL MEDICINE

## 2019-09-19 PROCEDURE — 4040F PNEUMOC VAC/ADMIN/RCVD: CPT | Performed by: INTERNAL MEDICINE

## 2019-09-19 PROCEDURE — 1036F TOBACCO NON-USER: CPT | Performed by: INTERNAL MEDICINE

## 2019-09-19 PROCEDURE — G0008 ADMIN INFLUENZA VIRUS VAC: HCPCS | Performed by: INTERNAL MEDICINE

## 2019-09-19 RX ORDER — ESCITALOPRAM OXALATE 10 MG/1
10 TABLET ORAL DAILY
Qty: 90 TABLET | Refills: 1 | Status: SHIPPED | OUTPATIENT
Start: 2019-09-19 | End: 2020-04-08

## 2019-09-19 RX ORDER — KETOROLAC TROMETHAMINE 5 MG/ML
1 SOLUTION OPHTHALMIC 2 TIMES DAILY
COMMUNITY
End: 2020-06-16 | Stop reason: ALTCHOICE

## 2019-09-19 ASSESSMENT — ENCOUNTER SYMPTOMS
VOMITING: 0
ABDOMINAL PAIN: 0
NAUSEA: 0
SHORTNESS OF BREATH: 0
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
## 2019-09-20 ENCOUNTER — TELEPHONE (OUTPATIENT)
Dept: INTERNAL MEDICINE CLINIC | Age: 84
End: 2019-09-20

## 2019-09-20 DIAGNOSIS — R17 ELEVATED BILIRUBIN: ICD-10-CM

## 2019-09-20 DIAGNOSIS — E21.0 PRIMARY HYPERPARATHYROIDISM (HCC): Primary | ICD-10-CM

## 2019-09-20 DIAGNOSIS — K80.20 CALCULUS OF GALLBLADDER WITHOUT CHOLECYSTITIS WITHOUT OBSTRUCTION: ICD-10-CM

## 2019-09-20 RX ORDER — PREDNISOLONE ACETATE 10 MG/ML
1 SUSPENSION/ DROPS OPHTHALMIC
COMMUNITY
End: 2020-06-16 | Stop reason: ALTCHOICE

## 2019-10-22 ENCOUNTER — HOSPITAL ENCOUNTER (OUTPATIENT)
Dept: NEUROLOGY | Age: 84
Discharge: HOME | End: 2019-10-22
Payer: MEDICARE

## 2019-10-22 PROCEDURE — 95819 EEG AWAKE AND ASLEEP: CPT

## 2020-01-22 ENCOUNTER — TELEPHONE (OUTPATIENT)
Dept: INTERNAL MEDICINE CLINIC | Age: 85
End: 2020-01-22

## 2020-01-30 RX ORDER — CARVEDILOL 12.5 MG/1
TABLET ORAL
Qty: 135 TABLET | Refills: 1 | Status: SHIPPED | OUTPATIENT
Start: 2020-01-30 | End: 2020-04-24

## 2020-01-30 RX ORDER — CLOPIDOGREL BISULFATE 75 MG/1
TABLET ORAL
Qty: 90 TABLET | Refills: 1 | Status: SHIPPED | OUTPATIENT
Start: 2020-01-30 | End: 2020-03-04 | Stop reason: SDUPTHER

## 2020-01-30 RX ORDER — LISINOPRIL 20 MG/1
TABLET ORAL
Qty: 90 TABLET | Refills: 1 | Status: SHIPPED | OUTPATIENT
Start: 2020-01-30 | End: 2020-03-04 | Stop reason: SDUPTHER

## 2020-01-30 RX ORDER — ASPIRIN 81 MG/1
81 TABLET ORAL DAILY
Qty: 90 TABLET | Refills: 3 | Status: SHIPPED | OUTPATIENT
Start: 2020-01-30

## 2020-01-30 NOTE — TELEPHONE ENCOUNTER
Medication Refill    Medication needing refilled:   aspirin 81 MG EC tablet    Doseage of the medication: 81 mg    How are you taking this medication (QD, BID, TID, QID, PRN): 1 tablet daily    30 or 90 day supply called in: 80    Which Pharmacy are we sending the medication to?:    [facility] [place], [address] [address]

## 2020-01-30 NOTE — TELEPHONE ENCOUNTER
RX APPROVAL:      Refill:   Requested Prescriptions     Pending Prescriptions Disp Refills    clopidogrel (PLAVIX) 75 MG tablet [Pharmacy Med Name: CLOPIDOGREL 75 MG TABLET] 90 tablet 1     Sig: TAKE ONE TABLET BY MOUTH DAILY    carvedilol (COREG) 12.5 MG tablet [Pharmacy Med Name: CARVEDILOL 12.5 MG TABLET] 135 tablet 2     Sig: TAKE ONE AND ONE-HALF TABLET BY MOUTH TWICE A DAY WITH MEALS    lisinopril (PRINIVIL;ZESTRIL) 20 MG tablet [Pharmacy Med Name: LISINOPRIL 20 MG TABLET] 90 tablet 0     Sig: TAKE ONE TABLET BY MOUTH DAILY      Last OV: 9/10/2019   Last EKG:   Last Labs:   Lab Results   Component Value Date    GLUCOSE 117 09/19/2019    BUN 27 09/19/2019    CREATININE 0.9 09/19/2019    LABGLOM 59 09/19/2019     09/19/2019    K 4.7 09/19/2019    K 4.2 08/23/2019     09/19/2019    CO2 29 09/19/2019    CALCIUM 10.1 09/19/2019     Lab Results   Component Value Date     09/19/2019     09/19/2019    CO2 29 09/19/2019    ANIONGAP 11 09/19/2019    GLUCOSE 117 09/19/2019    BUN 27 09/19/2019    CREATININE 0.9 09/19/2019    LABGLOM 59 09/19/2019    GFRAA >60 09/19/2019    CALCIUM 10.1 09/19/2019    PROT 6.6 09/19/2019    LABALBU 4.0 09/19/2019    BILITOT 1.1 09/19/2019    ALKPHOS 106 09/19/2019    AST 24 09/19/2019    ALT 16 09/19/2019    GLOB 2.9 11/04/2018     Lab Results   Component Value Date    ALT 16 09/19/2019    AST 24 09/19/2019     Lab Results   Component Value Date    K 4.7 09/19/2019    K 4.2 08/23/2019       Plan and labs reviewed

## 2020-03-04 ENCOUNTER — TELEPHONE (OUTPATIENT)
Dept: CARDIOLOGY CLINIC | Age: 85
End: 2020-03-04

## 2020-03-04 RX ORDER — LISINOPRIL 20 MG/1
TABLET ORAL
Qty: 90 TABLET | Refills: 1 | Status: SHIPPED | OUTPATIENT
Start: 2020-03-04 | End: 2020-11-10 | Stop reason: SDUPTHER

## 2020-03-04 RX ORDER — CLOPIDOGREL BISULFATE 75 MG/1
TABLET ORAL
Qty: 90 TABLET | Refills: 1 | Status: SHIPPED | OUTPATIENT
Start: 2020-03-04 | End: 2020-11-10 | Stop reason: SDUPTHER

## 2020-04-01 ENCOUNTER — TELEPHONE (OUTPATIENT)
Dept: INTERNAL MEDICINE CLINIC | Age: 85
End: 2020-04-01

## 2020-04-24 RX ORDER — CARVEDILOL 12.5 MG/1
TABLET ORAL
Qty: 135 TABLET | Refills: 0 | Status: SHIPPED | OUTPATIENT
Start: 2020-04-24 | End: 2020-06-16 | Stop reason: SDUPTHER

## 2020-06-15 ENCOUNTER — TELEPHONE (OUTPATIENT)
Dept: CARDIOLOGY CLINIC | Age: 85
End: 2020-06-15

## 2020-06-15 ENCOUNTER — TELEPHONE (OUTPATIENT)
Dept: INTERNAL MEDICINE CLINIC | Age: 85
End: 2020-06-15

## 2020-06-16 ENCOUNTER — VIRTUAL VISIT (OUTPATIENT)
Dept: CARDIOLOGY CLINIC | Age: 85
End: 2020-06-16
Payer: MEDICARE

## 2020-06-16 PROCEDURE — 99443 PR PHYS/QHP TELEPHONE EVALUATION 21-30 MIN: CPT | Performed by: INTERNAL MEDICINE

## 2020-06-16 RX ORDER — CARVEDILOL 12.5 MG/1
12.5 TABLET ORAL 2 TIMES DAILY WITH MEALS
Qty: 180 TABLET | Refills: 3 | Status: SHIPPED | OUTPATIENT
Start: 2020-06-16

## 2020-06-16 RX ORDER — ATORVASTATIN CALCIUM 40 MG/1
TABLET, FILM COATED ORAL
Qty: 90 TABLET | Refills: 1 | Status: SHIPPED | OUTPATIENT
Start: 2020-06-16

## 2020-06-16 NOTE — LETTER
clopidogrel (PLAVIX) 75 MG tablet TAKE ONE TABLET BY MOUTH DAILY Yes Matilda Paniagua DO   aspirin 81 MG EC tablet Take 1 tablet by mouth daily Yes Matilda Paniagua DO   hydrALAZINE (APRESOLINE) 50 MG tablet Take 50 mg by mouth daily as needed  Yes Historical Provider, MD   ferrous sulfate 325 (65 Fe) MG tablet Take 325 mg by mouth daily as needed Yes Historical Provider, MD   escitalopram (LEXAPRO) 10 MG tablet TAKE ONE TABLET BY MOUTH DAILY  Patient not taking: Reported on 6/16/2020  Ky Álvarez MD       Allergies:  Prednisone and Tylenol [acetaminophen]     Review of Systems:   Review of Systems    Physical Examination:    Vital Signs: (As obtained by patient/caregiver at home)    Constitutional: Appears well-developed and well-nourished   No apparent distress    Mental status/ Psych: Alert and awake , Normal mood and affect   Oriented to person/place/time   Able to follow commands    HEENT/Neuro:EOM intact     Normocephalic, atraumatic. No facial asymmetry  Mucous membranes are moist.   No visualized mass   Pulmonary/Chest: Respiratory effort normal.          No visualized signs of difficulty breathing or respiratory distress  Musculoskeletal:   Normal gait with no signs of ataxia         Normal range of motion of neck  Skin:        No significant exanthematous lesions or discoloration noted on facial skin            Other pertinent observable physical exam findings:-    Due to this being a TeleHealth encounter, evaluation of the following organ systems is limited: Vitals/Constitutional/EENT/Resp/CV/GI//MS/Neuro/Skin/Heme-Lymph-Imm. Imaging:  I have reviewed the below testing personally:    ECHO:   2/28/18  Normal left ventricle size and systolic function with an EF 60%. Moderate concentric left ventricular hypertrophy. Diastolic filling parameters suggests diastolic function. Moderate mitral regurgitation  The left atrium is dilated. Moderate tricuspid regurgitation. RVSP 44mmHg. The right atrium is mildly dilated. Mild pulmonic regurgitation. Impression/Recommendations    Ms. Andres Mancia is a 80 y.o. female patient with:    Hypertension  CAD with distant stent  Chronic Diastolic HF (LVEF 73% with moderate MR), reported to be compensated   Chronic atrial fibrillation, reported to be rate controlled, [id number]= 8 (Hx. CVA), [facility] with gait dysfunction/ re current falls (Femoral fracture with ORIF April 2020)  Dysphagia, with hx. aspiration pneumonia   Hyperlipidemia, controlled   Iron deficiency anemia  DNR status reported during last admission         ASA 81 mg  Clopidogrel 75 mg  Atorvastatin 40 mg   Carvedilol 12.5 mg bid   Lisinopril 20 mg daily   Hydralazine 50 mg PO PRN SBP > 160   Lasix 20 mg daily PRN. Weight stable at this time. 25 minute telephone discussion with Brianne's daughter Deepti (with Ana Maria Tolentino having known baseline dysarthria). We discussed the above regimen and PRN indications. She knows to call if Ana Maria Tolentino has concerning trend in vitals or reports symptoms. Return in about 3 months (around 9/16/2020). Patient Instructions   Decrease Carvedilol to 12.5 mg (1 tablet) twice daily  No other medication changes  Follow up in 3 months        Pursuant to the emergency declaration under the [facility] waiver authority and the [employer] and [employer] Act, this Virtual  Visit was conducted, with patient's consent, to reduce the patient's risk of exposure to COVID-19 and provide continuity of care for an established patient. Services were provided through a video synchronous discussion virtually to substitute for in-person clinic visit. Srinivasan Minaya DO, [facility]  Interventional Cardiology       [address], [address] [facility]  o: [phone]      NOTE:  This report was transcribed using voice recognition software.

## 2020-06-16 NOTE — PROGRESS NOTES
Transportation needs     Medical: Not on file     Non-medical: Not on file   Tobacco Use    Smoking status: Never Smoker    Smokeless tobacco: Never Used   Substance and Sexual Activity    Alcohol use: No    Drug use: Not on file    Sexual activity: Not on file   Lifestyle    Physical activity     Days per week: Not on file     Minutes per session: Not on file    Stress: Not on file   Relationships    Social connections     Talks on phone: Not on file     Gets together: Not on file     Attends Tenri[employer] service: Not on file     Active member of club or organization: Not on file     Attends meetings of clubs or organizations: Not on file     Relationship status: Not on file    Intimate partner violence     Fear of current or ex partner: Not on file     Emotionally abused: Not on file     Physically abused: Not on file     Forced sexual activity: Not on file   Other Topics Concern    Not on file   Social History Narrative    Not on file        Family History:      Problem Relation Age of Onset    Cancer Father         unsure of type    Cancer Brother         lung cancer       Medications:  [x] Medications and dosages reviewed. Prior to Visit Medications    Medication Sig Taking?  Authorizing Provider   atorvastatin (LIPITOR) 40 MG tablet TAKE ONE TABLET BY MOUTH DAILY Yes Cindi Moran DO   carvedilol (COREG) 12.5 MG tablet Take 1 tablet by mouth 2 times daily (with meals) Yes Cindi Moran DO   lisinopril (PRINIVIL;ZESTRIL) 20 MG tablet TAKE ONE TABLET BY MOUTH DAILY Yes Cindi Moran DO   clopidogrel (PLAVIX) 75 MG tablet TAKE ONE TABLET BY MOUTH DAILY Yes Cindi Moran DO   aspirin 81 MG EC tablet Take 1 tablet by mouth daily Yes Cindi Moran DO   hydrALAZINE (APRESOLINE) 50 MG tablet Take 50 mg by mouth daily as needed  Yes Historical Provider, MD   ferrous sulfate 325 (65 Fe) MG tablet Take 325 mg by mouth daily as needed Yes Historical Provider, MD   escitalopram ([employer])

## 2020-06-17 NOTE — TELEPHONE ENCOUNTER
I verified with pharmacy there were no issues with insurance for decrease in Carvedilol from 18.75 mg BID to 12.5 mg BID. Spoke with pharmacy tech who said since she has left over tabs from previous fill, she needs to use those up before getting new refill. Actual tablet dose is not affected; she just goes from 1.5 tablets BID to 1 tablet BID. Called and LVM for [place] ([facility] Instructed her to call back with questions.

## 2020-07-09 RX ORDER — ESCITALOPRAM OXALATE 10 MG/1
TABLET ORAL
Qty: 30 TABLET | Refills: 0 | Status: SHIPPED | OUTPATIENT
Start: 2020-07-09

## 2020-07-09 NOTE — TELEPHONE ENCOUNTER
Refill sent for 30 day supply. Patient overdue for appt. Attempted to contact patient, no answer. Left message advising.

## 2020-10-05 ENCOUNTER — TELEPHONE (OUTPATIENT)
Dept: CARDIOLOGY CLINIC | Age: 85
End: 2020-10-05

## 2020-10-05 NOTE — TELEPHONE ENCOUNTER
Pt daughter calling asking if the appt for tomorrow should be rescheduled because of the BP readings have been traveling a lot and pt daughter said that the machine was calibrated but feels it has been given false readings and is looking to buy a new machine.  pls call to advise thank you

## 2020-10-05 NOTE — TELEPHONE ENCOUNTER
Called patient to confirm appointment and they would like to reschedule for a 30 min appt at the end of the day next available. There are no appointments the rest of the year. Please let me know where to schedule or call patient. Thanks.   ben
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
## 2020-10-28 ENCOUNTER — TELEPHONE (OUTPATIENT)
Dept: CARDIOLOGY CLINIC | Age: 85
End: 2020-10-28

## 2020-10-29 NOTE — TELEPHONE ENCOUNTER
Spoke to daughter Deepti. They are not able to have Telephone appt on 11/3/20. Daughter asking to speak to [facility] regarding concerns. Wants to make sure [facility] knows that they waited for hours for last call. I told daughter [facility] was out 10/29/20 and would be back tomorrow 10/30/20, asking for a call from [facility] in the afternoon. States they can not do a VV visit it must be a Telephone call and number listed is correct.

## 2020-10-29 NOTE — TELEPHONE ENCOUNTER
Left a message for [facility], with instructions per Dr. Violetta Tillman. Advised to call back if the 11/10 @ 12:30 phone visit needs to be changed.

## 2020-10-30 ENCOUNTER — TELEPHONE (OUTPATIENT)
Dept: CARDIOLOGY CLINIC | Age: 85
End: 2020-10-30

## 2020-11-02 ENCOUNTER — TELEPHONE (OUTPATIENT)
Dept: CARDIOLOGY CLINIC | Age: 85
End: 2020-11-02

## 2020-11-02 NOTE — TELEPHONE ENCOUNTER
Deepti is keeping a close eye on Jennie's blood pressures- she will keep ov with [facility] 11/10/20.
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
## 2020-11-10 ENCOUNTER — TELEPHONE (OUTPATIENT)
Dept: CARDIOLOGY CLINIC | Age: 85
End: 2020-11-10

## 2020-11-10 ENCOUNTER — VIRTUAL VISIT (OUTPATIENT)
Dept: CARDIOLOGY CLINIC | Age: 85
End: 2020-11-10
Payer: MEDICARE

## 2020-11-10 PROCEDURE — 99422 OL DIG E/M SVC 11-20 MIN: CPT | Performed by: INTERNAL MEDICINE

## 2020-11-10 RX ORDER — HYDRALAZINE HYDROCHLORIDE 50 MG/1
50 TABLET, FILM COATED ORAL DAILY PRN
Qty: 30 TABLET | Refills: 11 | Status: SHIPPED | OUTPATIENT
Start: 2020-11-10

## 2020-11-10 RX ORDER — CLOPIDOGREL BISULFATE 75 MG/1
TABLET ORAL
Qty: 90 TABLET | Refills: 3 | Status: SHIPPED | OUTPATIENT
Start: 2020-11-10

## 2020-11-10 RX ORDER — LISINOPRIL 20 MG/1
TABLET ORAL
Qty: 90 TABLET | Refills: 3 | Status: SHIPPED | OUTPATIENT
Start: 2020-11-10

## 2020-11-10 NOTE — PATIENT INSTRUCTIONS
Continue only taking Hydralazine for systolic blood pressures greater than 160  No medication changes  Follow up in June 2021
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
